# Patient Record
Sex: MALE | Race: WHITE | NOT HISPANIC OR LATINO
[De-identification: names, ages, dates, MRNs, and addresses within clinical notes are randomized per-mention and may not be internally consistent; named-entity substitution may affect disease eponyms.]

---

## 2017-03-22 ENCOUNTER — NON-APPOINTMENT (OUTPATIENT)
Age: 82
End: 2017-03-22

## 2017-03-22 ENCOUNTER — APPOINTMENT (OUTPATIENT)
Dept: CARDIOLOGY | Facility: CLINIC | Age: 82
End: 2017-03-22
Payer: COMMERCIAL

## 2017-03-22 VITALS
HEART RATE: 88 BPM | BODY MASS INDEX: 32.11 KG/M2 | WEIGHT: 205 LBS | DIASTOLIC BLOOD PRESSURE: 74 MMHG | OXYGEN SATURATION: 96 % | SYSTOLIC BLOOD PRESSURE: 133 MMHG

## 2017-03-22 DIAGNOSIS — Z78.9 OTHER SPECIFIED HEALTH STATUS: ICD-10-CM

## 2017-03-22 DIAGNOSIS — Z86.73 PERSONAL HISTORY OF TRANSIENT ISCHEMIC ATTACK (TIA), AND CEREBRAL INFARCTION W/OUT RESIDUAL DEFICITS: ICD-10-CM

## 2017-03-22 DIAGNOSIS — Z86.39 PERSONAL HISTORY OF OTHER ENDOCRINE, NUTRITIONAL AND METABOLIC DISEASE: ICD-10-CM

## 2017-03-22 DIAGNOSIS — I47.1 SUPRAVENTRICULAR TACHYCARDIA: ICD-10-CM

## 2017-03-22 DIAGNOSIS — Z86.79 PERSONAL HISTORY OF OTHER DISEASES OF THE CIRCULATORY SYSTEM: ICD-10-CM

## 2017-03-22 DIAGNOSIS — I35.8 OTHER NONRHEUMATIC AORTIC VALVE DISORDERS: ICD-10-CM

## 2017-03-22 DIAGNOSIS — I86.8 VARICOSE VEINS OF OTHER SPECIFIED SITES: ICD-10-CM

## 2017-03-22 DIAGNOSIS — I25.10 ATHEROSCLEROTIC HEART DISEASE OF NATIVE CORONARY ARTERY W/OUT ANGINA PECTORIS: ICD-10-CM

## 2017-03-22 DIAGNOSIS — Z00.00 ENCOUNTER FOR GENERAL ADULT MEDICAL EXAMINATION W/OUT ABNORMAL FINDINGS: ICD-10-CM

## 2017-03-22 PROCEDURE — 99215 OFFICE O/P EST HI 40 MIN: CPT

## 2017-03-22 PROCEDURE — 93000 ELECTROCARDIOGRAM COMPLETE: CPT

## 2017-03-22 RX ORDER — AMLODIPINE BESYLATE 5 MG/1
5 TABLET ORAL DAILY
Qty: 90 | Refills: 3 | Status: ACTIVE | COMMUNITY
Start: 2017-03-22

## 2017-03-22 RX ORDER — ASPIRIN ENTERIC COATED TABLETS 81 MG 81 MG/1
81 TABLET, DELAYED RELEASE ORAL
Qty: 60 | Refills: 0 | Status: ACTIVE | COMMUNITY
Start: 2017-03-22

## 2017-03-22 RX ORDER — ASPIRIN AND DIPYRIDAMOLE 25; 200 MG/1; MG/1
25-200 CAPSULE, EXTENDED RELEASE ORAL TWICE DAILY
Refills: 0 | Status: DISCONTINUED | COMMUNITY
Start: 2017-03-22 | End: 2017-03-22

## 2017-03-22 RX ORDER — FENOFIBRATE 160 MG/1
160 TABLET ORAL DAILY
Refills: 0 | Status: ACTIVE | COMMUNITY
Start: 2017-03-22

## 2017-03-23 ENCOUNTER — TRANSCRIPTION ENCOUNTER (OUTPATIENT)
Age: 82
End: 2017-03-23

## 2017-03-29 ENCOUNTER — MEDICATION RENEWAL (OUTPATIENT)
Age: 82
End: 2017-03-29

## 2017-03-29 RX ORDER — CLOPIDOGREL BISULFATE 75 MG/1
75 TABLET, FILM COATED ORAL DAILY
Qty: 90 | Refills: 3 | Status: ACTIVE | COMMUNITY
Start: 2017-03-22 | End: 1900-01-01

## 2017-05-02 ENCOUNTER — APPOINTMENT (OUTPATIENT)
Dept: CV DIAGNOSITCS | Facility: HOSPITAL | Age: 82
End: 2017-05-02

## 2017-05-02 ENCOUNTER — OUTPATIENT (OUTPATIENT)
Dept: OUTPATIENT SERVICES | Facility: HOSPITAL | Age: 82
LOS: 1 days | End: 2017-05-02
Payer: COMMERCIAL

## 2017-05-02 DIAGNOSIS — Z98.89 OTHER SPECIFIED POSTPROCEDURAL STATES: Chronic | ICD-10-CM

## 2017-05-02 DIAGNOSIS — I25.10 ATHEROSCLEROTIC HEART DISEASE OF NATIVE CORONARY ARTERY WITHOUT ANGINA PECTORIS: ICD-10-CM

## 2017-05-02 DIAGNOSIS — Q27.30 ARTERIOVENOUS MALFORMATION, SITE UNSPECIFIED: Chronic | ICD-10-CM

## 2017-05-02 PROCEDURE — 93306 TTE W/DOPPLER COMPLETE: CPT | Mod: 26

## 2017-05-02 PROCEDURE — 93306 TTE W/DOPPLER COMPLETE: CPT

## 2017-05-05 ENCOUNTER — NON-APPOINTMENT (OUTPATIENT)
Age: 82
End: 2017-05-05

## 2024-03-23 ENCOUNTER — INPATIENT (INPATIENT)
Facility: HOSPITAL | Age: 89
LOS: 3 days | Discharge: NURSING, REHAB OR LONG-TERM CARE | DRG: 394 | End: 2024-03-27
Attending: INTERNAL MEDICINE | Admitting: INTERNAL MEDICINE
Payer: MEDICARE

## 2024-03-23 VITALS
DIASTOLIC BLOOD PRESSURE: 66 MMHG | OXYGEN SATURATION: 98 % | WEIGHT: 192.02 LBS | TEMPERATURE: 98 F | RESPIRATION RATE: 16 BRPM | HEART RATE: 74 BPM | SYSTOLIC BLOOD PRESSURE: 133 MMHG | HEIGHT: 66 IN

## 2024-03-23 DIAGNOSIS — N40.0 BENIGN PROSTATIC HYPERPLASIA WITHOUT LOWER URINARY TRACT SYMPTOMS: ICD-10-CM

## 2024-03-23 DIAGNOSIS — I10 ESSENTIAL (PRIMARY) HYPERTENSION: ICD-10-CM

## 2024-03-23 DIAGNOSIS — Q27.30 ARTERIOVENOUS MALFORMATION, SITE UNSPECIFIED: Chronic | ICD-10-CM

## 2024-03-23 DIAGNOSIS — Z98.89 OTHER SPECIFIED POSTPROCEDURAL STATES: Chronic | ICD-10-CM

## 2024-03-23 DIAGNOSIS — K92.2 GASTROINTESTINAL HEMORRHAGE, UNSPECIFIED: ICD-10-CM

## 2024-03-23 DIAGNOSIS — I25.10 ATHEROSCLEROTIC HEART DISEASE OF NATIVE CORONARY ARTERY WITHOUT ANGINA PECTORIS: ICD-10-CM

## 2024-03-23 DIAGNOSIS — Z87.19 PERSONAL HISTORY OF OTHER DISEASES OF THE DIGESTIVE SYSTEM: ICD-10-CM

## 2024-03-23 DIAGNOSIS — K92.1 MELENA: ICD-10-CM

## 2024-03-23 LAB
ALBUMIN SERPL ELPH-MCNC: 3.6 G/DL — SIGNIFICANT CHANGE UP (ref 3.3–5)
ALP SERPL-CCNC: 46 U/L — SIGNIFICANT CHANGE UP (ref 40–120)
ALT FLD-CCNC: 13 U/L — SIGNIFICANT CHANGE UP (ref 10–45)
ANION GAP SERPL CALC-SCNC: 15 MMOL/L — SIGNIFICANT CHANGE UP (ref 5–17)
APPEARANCE UR: CLEAR — SIGNIFICANT CHANGE UP
APTT BLD: 28.5 SEC — SIGNIFICANT CHANGE UP (ref 24.5–35.6)
AST SERPL-CCNC: 22 U/L — SIGNIFICANT CHANGE UP (ref 10–40)
BASE EXCESS BLDV CALC-SCNC: -0.7 MMOL/L — SIGNIFICANT CHANGE UP (ref -2–3)
BASOPHILS # BLD AUTO: 0.01 K/UL — SIGNIFICANT CHANGE UP (ref 0–0.2)
BASOPHILS NFR BLD AUTO: 0.2 % — SIGNIFICANT CHANGE UP (ref 0–2)
BILIRUB SERPL-MCNC: 0.3 MG/DL — SIGNIFICANT CHANGE UP (ref 0.2–1.2)
BILIRUB UR-MCNC: NEGATIVE — SIGNIFICANT CHANGE UP
BUN SERPL-MCNC: 29 MG/DL — HIGH (ref 7–23)
CA-I SERPL-SCNC: 1.24 MMOL/L — SIGNIFICANT CHANGE UP (ref 1.15–1.33)
CALCIUM SERPL-MCNC: 9 MG/DL — SIGNIFICANT CHANGE UP (ref 8.4–10.5)
CHLORIDE BLDV-SCNC: 108 MMOL/L — SIGNIFICANT CHANGE UP (ref 96–108)
CHLORIDE SERPL-SCNC: 106 MMOL/L — SIGNIFICANT CHANGE UP (ref 96–108)
CO2 BLDV-SCNC: 26 MMOL/L — SIGNIFICANT CHANGE UP (ref 22–26)
CO2 SERPL-SCNC: 20 MMOL/L — LOW (ref 22–31)
COLOR SPEC: YELLOW — SIGNIFICANT CHANGE UP
CREAT SERPL-MCNC: 1.19 MG/DL — SIGNIFICANT CHANGE UP (ref 0.5–1.3)
DIFF PNL FLD: NEGATIVE — SIGNIFICANT CHANGE UP
EGFR: 58 ML/MIN/1.73M2 — LOW
EOSINOPHIL # BLD AUTO: 0.04 K/UL — SIGNIFICANT CHANGE UP (ref 0–0.5)
EOSINOPHIL NFR BLD AUTO: 0.8 % — SIGNIFICANT CHANGE UP (ref 0–6)
GAS PNL BLDV: 138 MMOL/L — SIGNIFICANT CHANGE UP (ref 136–145)
GAS PNL BLDV: SIGNIFICANT CHANGE UP
GLUCOSE BLDV-MCNC: 106 MG/DL — HIGH (ref 70–99)
GLUCOSE SERPL-MCNC: 120 MG/DL — HIGH (ref 70–99)
GLUCOSE UR QL: NEGATIVE MG/DL — SIGNIFICANT CHANGE UP
HCO3 BLDV-SCNC: 25 MMOL/L — SIGNIFICANT CHANGE UP (ref 22–29)
HCT VFR BLD CALC: 25.5 % — LOW (ref 39–50)
HCT VFR BLD CALC: 25.9 % — LOW (ref 39–50)
HCT VFR BLD CALC: 29.7 % — LOW (ref 39–50)
HCT VFR BLDA CALC: 29 % — LOW (ref 39–51)
HGB BLD CALC-MCNC: 9.7 G/DL — LOW (ref 12.6–17.4)
HGB BLD-MCNC: 7.9 G/DL — LOW (ref 13–17)
HGB BLD-MCNC: 8.3 G/DL — LOW (ref 13–17)
HGB BLD-MCNC: 9.2 G/DL — LOW (ref 13–17)
IMM GRANULOCYTES NFR BLD AUTO: 0.4 % — SIGNIFICANT CHANGE UP (ref 0–0.9)
INR BLD: 1.07 RATIO — SIGNIFICANT CHANGE UP (ref 0.85–1.18)
KETONES UR-MCNC: NEGATIVE MG/DL — SIGNIFICANT CHANGE UP
LACTATE BLDV-MCNC: 1.7 MMOL/L — SIGNIFICANT CHANGE UP (ref 0.5–2)
LEUKOCYTE ESTERASE UR-ACNC: NEGATIVE — SIGNIFICANT CHANGE UP
LIDOCAIN IGE QN: 40 U/L — SIGNIFICANT CHANGE UP (ref 7–60)
LYMPHOCYTES # BLD AUTO: 0.94 K/UL — LOW (ref 1–3.3)
LYMPHOCYTES # BLD AUTO: 18.3 % — SIGNIFICANT CHANGE UP (ref 13–44)
MCHC RBC-ENTMCNC: 31 GM/DL — LOW (ref 32–36)
MCHC RBC-ENTMCNC: 31 GM/DL — LOW (ref 32–36)
MCHC RBC-ENTMCNC: 31.1 PG — SIGNIFICANT CHANGE UP (ref 27–34)
MCHC RBC-ENTMCNC: 31.2 PG — SIGNIFICANT CHANGE UP (ref 27–34)
MCHC RBC-ENTMCNC: 31.2 PG — SIGNIFICANT CHANGE UP (ref 27–34)
MCHC RBC-ENTMCNC: 32 GM/DL — SIGNIFICANT CHANGE UP (ref 32–36)
MCV RBC AUTO: 100.7 FL — HIGH (ref 80–100)
MCV RBC AUTO: 100.8 FL — HIGH (ref 80–100)
MCV RBC AUTO: 97 FL — SIGNIFICANT CHANGE UP (ref 80–100)
MONOCYTES # BLD AUTO: 0.45 K/UL — SIGNIFICANT CHANGE UP (ref 0–0.9)
MONOCYTES NFR BLD AUTO: 8.8 % — SIGNIFICANT CHANGE UP (ref 2–14)
NEUTROPHILS # BLD AUTO: 3.67 K/UL — SIGNIFICANT CHANGE UP (ref 1.8–7.4)
NEUTROPHILS NFR BLD AUTO: 71.5 % — SIGNIFICANT CHANGE UP (ref 43–77)
NITRITE UR-MCNC: NEGATIVE — SIGNIFICANT CHANGE UP
NRBC # BLD: 0 /100 WBCS — SIGNIFICANT CHANGE UP (ref 0–0)
OB PNL STL: POSITIVE
PCO2 BLDV: 44 MMHG — SIGNIFICANT CHANGE UP (ref 42–55)
PH BLDV: 7.36 — SIGNIFICANT CHANGE UP (ref 7.32–7.43)
PH UR: 7 — SIGNIFICANT CHANGE UP (ref 5–8)
PLATELET # BLD AUTO: 154 K/UL — SIGNIFICANT CHANGE UP (ref 150–400)
PLATELET # BLD AUTO: 168 K/UL — SIGNIFICANT CHANGE UP (ref 150–400)
PLATELET # BLD AUTO: 178 K/UL — SIGNIFICANT CHANGE UP (ref 150–400)
PLATELET MAPPING ACTF MAX AMPLITUDE: 15.6 MM — SIGNIFICANT CHANGE UP (ref 2–19)
PLATELET MAPPING ADP MAXIMUM AMPLITUDE: 58.9 MM — SIGNIFICANT CHANGE UP (ref 45–69)
PLATELET MAPPING ADP PERCENT INHIBITION: 11.3 % — SIGNIFICANT CHANGE UP (ref 0–17)
PLATELET MAPPING ARACHIDONIC ACID INHIBITION: 41.2 % — HIGH (ref 0–11)
PLATELET MAPPING HKH MAXIMUM AMPLITUDE: 64.4 MM — SIGNIFICANT CHANGE UP (ref 53–68)
PO2 BLDV: 42 MMHG — SIGNIFICANT CHANGE UP (ref 25–45)
POTASSIUM BLDV-SCNC: 4.6 MMOL/L — SIGNIFICANT CHANGE UP (ref 3.5–5.1)
POTASSIUM SERPL-MCNC: 4.6 MMOL/L — SIGNIFICANT CHANGE UP (ref 3.5–5.3)
POTASSIUM SERPL-SCNC: 4.6 MMOL/L — SIGNIFICANT CHANGE UP (ref 3.5–5.3)
PROT SERPL-MCNC: 6.7 G/DL — SIGNIFICANT CHANGE UP (ref 6–8.3)
PROT UR-MCNC: NEGATIVE MG/DL — SIGNIFICANT CHANGE UP
PROTHROM AB SERPL-ACNC: 11.8 SEC — SIGNIFICANT CHANGE UP (ref 9.5–13)
RBC # BLD: 2.53 M/UL — LOW (ref 4.2–5.8)
RBC # BLD: 2.67 M/UL — LOW (ref 4.2–5.8)
RBC # BLD: 2.95 M/UL — LOW (ref 4.2–5.8)
RBC # FLD: 14.9 % — HIGH (ref 10.3–14.5)
RBC # FLD: 15 % — HIGH (ref 10.3–14.5)
RBC # FLD: 16.8 % — HIGH (ref 10.3–14.5)
SAO2 % BLDV: 72.1 % — SIGNIFICANT CHANGE UP (ref 67–88)
SODIUM SERPL-SCNC: 141 MMOL/L — SIGNIFICANT CHANGE UP (ref 135–145)
SP GR SPEC: >1.03 — HIGH (ref 1–1.03)
UROBILINOGEN FLD QL: 0.2 MG/DL — SIGNIFICANT CHANGE UP (ref 0.2–1)
WBC # BLD: 4.64 K/UL — SIGNIFICANT CHANGE UP (ref 3.8–10.5)
WBC # BLD: 5.11 K/UL — SIGNIFICANT CHANGE UP (ref 3.8–10.5)
WBC # BLD: 5.13 K/UL — SIGNIFICANT CHANGE UP (ref 3.8–10.5)
WBC # FLD AUTO: 4.64 K/UL — SIGNIFICANT CHANGE UP (ref 3.8–10.5)
WBC # FLD AUTO: 5.11 K/UL — SIGNIFICANT CHANGE UP (ref 3.8–10.5)
WBC # FLD AUTO: 5.13 K/UL — SIGNIFICANT CHANGE UP (ref 3.8–10.5)

## 2024-03-23 PROCEDURE — 86077 PHYS BLOOD BANK SERV XMATCH: CPT

## 2024-03-23 PROCEDURE — 99222 1ST HOSP IP/OBS MODERATE 55: CPT | Mod: GC

## 2024-03-23 PROCEDURE — 74177 CT ABD & PELVIS W/CONTRAST: CPT | Mod: 26

## 2024-03-23 PROCEDURE — 99285 EMERGENCY DEPT VISIT HI MDM: CPT | Mod: FS

## 2024-03-23 PROCEDURE — 93010 ELECTROCARDIOGRAM REPORT: CPT

## 2024-03-23 RX ORDER — FENOFIBRATE,MICRONIZED 130 MG
145 CAPSULE ORAL DAILY
Refills: 0 | Status: DISCONTINUED | OUTPATIENT
Start: 2024-03-23 | End: 2024-03-27

## 2024-03-23 RX ORDER — PANTOPRAZOLE SODIUM 20 MG/1
8 TABLET, DELAYED RELEASE ORAL
Qty: 80 | Refills: 0 | Status: DISCONTINUED | OUTPATIENT
Start: 2024-03-23 | End: 2024-03-24

## 2024-03-23 RX ORDER — TAMSULOSIN HYDROCHLORIDE 0.4 MG/1
0.4 CAPSULE ORAL AT BEDTIME
Refills: 0 | Status: DISCONTINUED | OUTPATIENT
Start: 2024-03-23 | End: 2024-03-27

## 2024-03-23 RX ORDER — PANTOPRAZOLE SODIUM 20 MG/1
40 TABLET, DELAYED RELEASE ORAL ONCE
Refills: 0 | Status: COMPLETED | OUTPATIENT
Start: 2024-03-23 | End: 2024-03-23

## 2024-03-23 RX ORDER — KETOROLAC TROMETHAMINE 0.5 %
1 DROPS OPHTHALMIC (EYE)
Refills: 0 | DISCHARGE

## 2024-03-23 RX ORDER — KETOROLAC TROMETHAMINE 0.5 %
1 DROPS OPHTHALMIC (EYE)
Refills: 0 | Status: DISCONTINUED | OUTPATIENT
Start: 2024-03-23 | End: 2024-03-27

## 2024-03-23 RX ORDER — OMEPRAZOLE 10 MG/1
1 CAPSULE, DELAYED RELEASE ORAL
Refills: 0 | DISCHARGE

## 2024-03-23 RX ORDER — ATORVASTATIN CALCIUM 80 MG/1
10 TABLET, FILM COATED ORAL AT BEDTIME
Refills: 0 | Status: DISCONTINUED | OUTPATIENT
Start: 2024-03-23 | End: 2024-03-27

## 2024-03-23 RX ADMIN — PANTOPRAZOLE SODIUM 10 MG/HR: 20 TABLET, DELAYED RELEASE ORAL at 21:33

## 2024-03-23 RX ADMIN — PANTOPRAZOLE SODIUM 40 MILLIGRAM(S): 20 TABLET, DELAYED RELEASE ORAL at 09:53

## 2024-03-23 RX ADMIN — Medication 1 DROP(S): at 23:51

## 2024-03-23 RX ADMIN — ATORVASTATIN CALCIUM 10 MILLIGRAM(S): 80 TABLET, FILM COATED ORAL at 21:34

## 2024-03-23 RX ADMIN — PANTOPRAZOLE SODIUM 10 MG/HR: 20 TABLET, DELAYED RELEASE ORAL at 10:02

## 2024-03-23 RX ADMIN — TAMSULOSIN HYDROCHLORIDE 0.4 MILLIGRAM(S): 0.4 CAPSULE ORAL at 21:34

## 2024-03-23 RX ADMIN — Medication 1 DROP(S): at 18:39

## 2024-03-23 NOTE — CHART NOTE - NSCHARTNOTEFT_GEN_A_CORE
hemoglobin dropped to 7.9 stays hemodynamically stable   discussed with GI fellow re drop in hemoglobin  GI have d/w IR who Recommended observation and supportive care and to recall if becomes hemodynamically unstablle  discussed with ACP  vitals q 4 and stat  recall IR if BP < 100  transfusion one unit stat   CBC q 6

## 2024-03-23 NOTE — H&P ADULT - NSHPADDITIONALINFOADULT_GEN_ALL_CORE
discussed with patient in detail, expresses understanding of treatment plans.  discussed with patient's wife at bedside in detail  discussed with Emergency Department attending   discussed with covering ACP

## 2024-03-23 NOTE — H&P ADULT - ASSESSMENT
89-year-old male history of CAD status post quadruple bypass on Aggrenox, HTN, HLD, AVM presents with rectal bleeding CTA positive for active colonic bleed likely diverticular in etiology, hemodynamically stable

## 2024-03-23 NOTE — ED PROVIDER NOTE - NSICDXPASTSURGICALHX_GEN_ALL_CORE_FT
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 patient is not 100% sure    CBG (Coronary Bypass Graft)     H/O hemorrhoidectomy     Knee Joint Replacement

## 2024-03-23 NOTE — ED ADULT NURSE NOTE - OBJECTIVE STATEMENT
89 y/o male with PMH of arrives to the ER complaining of rectal bleeding. Pt reports developing rectal bleeding since4 am this morning. Pt rectal dark blood  Pt denies SOB, chest pain, dizziness, N/V/D, urinary symptoms, fevers, chills.  On assessment pt is well appearing, A&Ox4, speaking coherently, airway is patent, breathing spontaneously and unlabored. Skin is dry, warm. Abdomen is soft, no distended, no tender. Full ROM in all extremities.  Patient undressed and placed into gown,  Pt placed on continuous cardiac monitor, NSR noted, bilateral; bilateral 20 G RAC placed, blood sent to the lab. Comfort and safety provided, side rails up with bed locked and in lowest position for safety. call bell within reach. [place] provided. will continue to reassess. 89 y/o male with PMH of  CAD status post quadruple bypass on Aggrenox, HTN, HLD, AVM arrives to the ER complaining of rectal bleeding. Pt reports developing rectal bleeding since 4 am this morning. Pt reports several episodes of dark blood rectally.  Pt reports taking Aggrenox twice at day, last intake was last night. At arrival pt denies SOB, chest pain, dizziness, N/V/D, urinary symptoms, fevers, chills.  On assessment pt is well appearing, A&Ox4, speaking coherently, airway is patent, breathing spontaneously and unlabored. Skin is dry, warm. Abdomen is soft, no distended, no tender. Full ROM in all extremities.  Patient undressed and placed into gown,  Pt placed on continuous cardiac monitor, NSR noted, bilateral 20 G RAC placed in the median cubital, blood sent to the lab. Comfort and safety provided, side rails up with bed locked and in lowest position for safety. call bell within reach. [place] provided. 89 y/o male with PMH of  CAD status post quadruple bypass on Aggrenox, HTN, HLD, AVM arrives to the ER complaining of rectal bleeding. Pt reports developing rectal bleeding since 4 am this morning. Pt reports noticing initially a red bright blood in the stool, after that having several episodes of dark blood rectally.  Pt reports taking Aggrenox twice at day, last intake was last night. At arrival pt denies SOB, chest pain, dizziness, N/V/D, urinary symptoms, fevers, chills.  On assessment pt is well appearing, A&Ox4, speaking coherently, airway is patent, breathing spontaneously and unlabored. Skin is dry, warm. Abdomen is soft, no distended, no tender. Full ROM in all extremities.  Patient undressed and placed into gown,  Pt placed on continuous cardiac monitor, NSR noted, bilateral 20 G RAC placed in the median cubital, blood sent to the lab. Comfort and safety provided, side rails up with bed locked and in lowest position for safety. call bell within reach. [place] provided. 89 y/o male with PMH of BPH, CAD status post quadruple bypass on Aggrenox, HTN, HLD, AVM arrives to the ER complaining of rectal bleeding. Pt reports developing rectal bleeding since 4 am this morning. Pt reports noticing initially a red bright blood in the stool, after that having several episodes of dark blood rectally.  Pt reports taking Aggrenox twice at day, last intake was last night. At arrival pt denies SOB, chest pain, dizziness, N/V/D, urinary symptoms, fevers, chills.  On assessment pt is well appearing, A&Ox4, speaking coherently, airway is patent, breathing spontaneously and unlabored. Skin is dry, warm. Abdomen is soft, no distended, no tender. Full ROM in all extremities.  Patient undressed and placed into gown. Pt placed on continuous cardiac monitor, NSR noted, bilateral 20 G RAC placed in the median cubital, blood sent to the lab. Comfort and safety provided, side rails up with bed locked and in lowest position for safety. call bell within reach. [place] provided.

## 2024-03-23 NOTE — CHART NOTE - NSCHARTNOTEFT_GEN_A_CORE
discussed with GI service earlier  ok to advance diet to clears    MICU note appreciated  deemed not to require transfer to MICU at this time     if patient becomes hemodynamically unstable will recall IR and MICU and keep patient NPO.    discussed with night ACP

## 2024-03-23 NOTE — ED PROVIDER NOTE - NSICDXPASTMEDICALHX_GEN_ALL_CORE_FT
PAST MEDICAL HISTORY:  BPH (Benign Prostatic Hyperplasia)     CAD (Coronary Artery Disease)     Coronary Artery Bypass Surgery 1998    HTN (Hypertension)     Hyperlipidemia     Lumbar Spinal Stenosis

## 2024-03-23 NOTE — ED ADULT NURSE REASSESSMENT NOTE - NS ED NURSE REASSESS COMMENT FT1
Pt resting comfortably in bed, denies any adverse transfusion reactions at this time. No signs of transfusion reaction noted. Call bell within reach.

## 2024-03-23 NOTE — ED PROVIDER NOTE - OBJECTIVE STATEMENT
89-year-old male history of CAD status post quadruple bypass on Aggrenox, HTN, HLD, AVM presents with rectal bleeding.  Patient reports 1 day of blood in stools initially had 1 episode of bright red blood streaking stools since then has had several episodes of loose dark foul-smelling stools.  Last took Aggrenox last night.  Patient wife reports he has been lethargic the last 2 days.  Patient denies abdominal pain, nausea, vomiting, hematemesis, hematuria, bruising, CP, SOB, weight changes, urinary symptoms.  Last colonoscopy/endoscopy over 10 years ago.  Follows with PMD at [facility]

## 2024-03-23 NOTE — H&P ADULT - PROBLEM SELECTOR PLAN 2
asymptomatic  chest pain free  continue statin  patient is euvolemic with no evidence of fluid overload

## 2024-03-23 NOTE — CONSULT NOTE ADULT - ASSESSMENT
Recommendations  - discussed with IR team - will plan for colonoscopy on Monday for evaluation of GI bleeding. If patient were to become hemodynamically unstable, recommend calling GI   - CLD  -     All recommendations are tentative until note is attested by attending.     Eva Nicole, PGY6  Gastroenterology/Hepatology Fellow  Available on [employer] Teams  [phone] (LiveRSVP Short Range Pager)  [phone] (Long Range Pager)    After 5pm, please contact the on-call GI fellow. [phone] 89-year-old male history of CAD status post quadruple bypass on Aggrenox, HTN, HLD, AVM presents with rectal bleeding.  Patient reports 1 day of blood in stools initially had 1 episode of bright red blood streaking stools since then has had several episodes of loose dark foul-smelling stools.      Recommendations  - trend CBC, keep active T&S, transfuse for Hgb<8  - discussed with IR team - will plan for colonoscopy on Monday for evaluation of GI bleeding. If patient were to become hemodynamically unstable, recommend calling IR for embolization  - CLD  - will order bowel prep tomorrow, please ensure that patient completes it  - NPO after MN on Sunday night  - please obtain cardiology optimization  - would hold the aggrenox if no cardiology contraindication, can continue ASA    All recommendations are tentative until note is attested by attending.     Eva Nicole, PGY6  Gastroenterology/Hepatology Fellow  Available on Microsoft Teams  [phone] (SurveySnap Short Range Pager)  [phone] (Long Range Pager)    After 5pm, please contact the on-call GI fellow. [phone]

## 2024-03-23 NOTE — H&P ADULT - HISTORY OF PRESENT ILLNESS
89-year-old male history of CAD status post quadruple bypass on Aggrenox, HTN, HLD, AVM presents with rectal bleeding.  Patient reports 1 day of blood in stools initially had 1 episode of bright red blood streaking stools since then has had several episodes of loose dark foul-smelling stools.  Last took Aggrenox last night.  Patient wife reports he has been lethargic the last 2 days.  Patient denies abdominal pain, nausea, vomiting, hematemesis, hematuria, bruising, chest pain or shortness of breath, weight changes, urinary symptoms.  Last colonoscopy/endoscopy over 10 years ago when as per patient he has an experimental "stent" placed in the colon. No current symptoms  89-year-old male history of CAD status post quadruple bypass on Aggrenox, HTN, HLD, AVM presents with rectal bleeding.  Patient reports 1 day of blood in stools initially had 1 episode of bright red blood streaking stools since then has had several episodes of loose dark foul-smelling stools.  Last took Aggrenox last night.  Patient wife reports he has been lethargic the last 2 days.  Patient denies abdominal pain, nausea, vomiting, hematemesis, hematuria, bruising, chest pain or shortness of breath, weight changes, urinary symptoms.  Last colonoscopy/endoscopy over 10 years ago when as per patient he has an experimental "stent" placed in the colon, however upon clarification it was a dural stent placed for non-GI related issue. No current symptoms

## 2024-03-23 NOTE — CONSULT NOTE ADULT - ASSESSMENT
89-year-old male history of CAD status post quadruple bypass on Aggrenox, HTN, HLD, AVM presents with rectal bleeding CTA positive for active colonic bleed suspected active bleed in ascending colon. MICU consulted for active hemorrhage.     #Melena  - Currently hemodynamically stable w/ /70 and asx  - Hgb initially 9.2 --> 7.9 --> 8.3  - S/p 1U pRBC's and currently receiving 1U PLT  - 1 episode of BRBPR since admission to hospital  - Seen by GI and IR w/ plans for colonoscopy on Monday     Recommendations:  - Trend CBC, keep active T&S, transfuse for Hgb <8  - Pending nonurgent scope for Monday   - If pt decompensates, please f/u IR and GI    Pt does not require ICU level of care at this time.       Final recommendations pending attestations by ICU attending.

## 2024-03-23 NOTE — CONSULT NOTE ADULT - ASSESSMENT
Interventional Radiology    Evaluate for Procedure:     HPI: 89y Male with PMHx of CAD status post quadruple bypass on Aggrenox, HTN, HLD, AVM presents with rectal bleeding.  Patient reports 1 day of blood in stools initially had 1 episode of bright red blood streaking stools since then has had several episodes of loose dark foul-smelling stools.  Last took Aggrenox last night.  Patient wife reports he has been lethargic the last 2 days.  Patient denies abdominal pain, nausea, vomiting, hematemesis, hematuria, bruising, chest pain or shortness of breath, weight changes, urinary symptoms.  Last colonoscopy/endoscopy over 10 years ago when as per patient he has an experimental "stent" placed in the colon, however upon clarification it was a dural stent placed for non-GI related issue. Patient reportedly has history of GI bleed in the past requiring IR intervention. IR c/s for possible angio/embo.    Allergies: penicillins (Vomiting)    Medications (Abx/Cardiac/Anticoagulation/Blood Products)      Data:  167.6  87.1  T(C): 36.7  HR: 70  BP: 123/60  RR: 16  SpO2: 100%    -WBC 5.11 / HgB 7.9 / Hct 25.5 / Plt 168  -Na 141 / Cl 106 / BUN 29 / Glucose 120  -K 4.6 / CO2 20 / Cr 1.19  -ALT 13 / Alk Phos 46 / T.Bili 0.3  -INR 1.07 / PTT 28.5      Radiology:     Assessment/Plan:   89y Male with PMHx of CAD status post quadruple bypass on Aggrenox, HTN, HLD, AVM presents with rectal bleeding.  Patient reports 1 day of blood in stools initially had 1 episode of bright red blood streaking stools since then has had several episodes of loose dark foul-smelling stools.  Last took Aggrenox last night.  Patient wife reports he has been lethargic the last 2 days.  Patient denies abdominal pain, nausea, vomiting, hematemesis, hematuria, bruising, chest pain or shortness of breath, weight changes, urinary symptoms.  Last colonoscopy/endoscopy over 10 years ago when as per patient he has an experimental "stent" placed in the colon, however upon clarification it was a dural stent placed for non-GI related issue. Patient reportedly has history of GI bleed in the past requiring IR intervention. IR c/s for possible angio/embo.    -- Given pt currently HDS, discussed with GI agree plan of nonemergent scope Monday allowing time for prep. Recommend conservative management at this time.  -- Please hold aggrenox as possible. Recommend giving platelets in attempt to replace dysfunctional platelets.  -- Recommend aggressive resuscitation as needed, goal Hgb >7.  -- If patient clinically decompensates, please reach out and can re-evaluate at that time. IR will continue to follow.    Bart Montero M.D.  PGY4/R3, Interventional Radiology Resident    -Available on Microsoft TEAMS for all non-urgent questions  -Emergent issues: [facility]-p.[phone]; [facility]-p.[phone] ([phone])  -Non-emergent consults: Please place a [place] order "Consult-Interventional Radiology" with an appropriate callback number  -Scheduling questions: [facility]: [phone]; [facility]: [phone]  -Clinic/Outpatient booking: [facility]: [phone]; [facility]: [phone]

## 2024-03-23 NOTE — ED PROVIDER NOTE - PROGRESS NOTE DETAILS
call placed to pt's PMD Dr Emile Arreola, left answer for answering service, awaiting callback - Jong Boone PA-C spoke with Dr Arreola, pt has hx of AVM, gastric ulcer, colonic polyps, external hemorrhoids. does not have admitting privileges. if requires admission will go to unattached hospitalist - Jong Boone PA-C Spoke with Dr. Best, recently saw him for recurrent hemorrhoids which are not bothering patient at this time.  Last hemoglobin was 10 in January.  Agrees with plan for admission, he will text Dr. Orourke and his wife for evaluation while inpatient.  Patient stable for admission at this time. - Jong Boone PA-C spoke again with Dr Best, Dr Orourke is unavailable to evaluate pt, requests house GI eval. consult placed via email - Jong Boone PA-C spoke with GI fellow Dr Nicole, aware of pt will see while admitted - Jong Boone PA-C Received call from radiology - CT suggestive of active bleeding from ascending colon, diverticula present. GI updated on results - Jong Boone PA-C Attending MD Lewis: CT bleeding scan findings are noted.  I was able to reach IR fellow on-call, he requested that I place consultation request through the computer because he is in the middle of a procedure.  Patient is currently hemodynamically stable and not requiring urgent PRBC transfusion as of yet.  GI team was notified by teams of CT findings.  Paged admitting hospitalist Dr. De Luna to make him aware of this as well awaiting his call back Attending MD Lewis: I discussed case with admitting hospitalist Dr. Joseph, he was made aware of CT findings of active colonic bleeding. Patient made aware findings as well.  Dr Joseph requests repeat CBC to be sent now.  I reassessed patient he states he feels fine at this time however he did have another bloody bowel movement just now.  Patient was being changed with vital signs not yet documented, will repeat vital signs and continue observation. Attending MD Lewis: I discussed case with interventional radiology who stated no immediate plans for IR embolization of active bleeding seen on CT.  They states if patient is hemodynamically stable they would recommend continued monitoring and PRBC resuscitation as clinically indicated.  GI team saw patient as well and GI spoke directly with the IR team and they are in agreement for watchful waiting at this time.

## 2024-03-23 NOTE — CONSULT NOTE ADULT - ATTENDING COMMENTS
89M CAD s/p 4vCABG on aggrenox, previous sigmoid colon AVM c/b GI bleeding s/p subselective catheterization of the tertiary branch of the distal HOPE and embolization by IR in 10/2015 presents with painless maroon hematochezia since 4 am today.    Hgb 9.2 on admission, recheck after multiple bloody bowel movements 7.9, now receiving 1u pRBC. Baseline 10 per ED documentation, patient receives all his care through [facility]. CTAP with diverticulosis and active hemorrhage in the ascending colon. Lactate 1.7, afebrile, HR 60s-70s, SBPs 120s-150s/60s-80s. Abd soft. Last colonoscopy 5+ years ago. Outpatient GI is Este[facility] ([facility]).    Presentation most c/w LGIB, likely diverticular vs AVM. Spoke with IR, hesitant to proceed to angio/embolization given hemodynamic stability and risks of these procedures. Discussed with IR and patient that an ascending source, particularly in presence of diverticular disease, requires a clean colon for safe evaluation.     We will plan for a colonoscopy on Monday morning with full anesthesia and nursing support, as this is the safest plan for a patient in his 90s with significant cardiac comorbidity who is hemodynamically stable, however if he decompensates in the interim IR will proceed to angio with possible embolization.    Continue clear liquid diet. Please obtain cardiology clearance for planned sedated colonoscopy. Please give the following: dulcolax 10 mg PO at noon tomorrow followed by 4L golytely, first half at 5 pm, second half at 11 pm. 89M CAD s/p 4vCABG on aggrenox, previous sigmoid colon AVM c/b GI bleeding s/p subselective catheterization of the tertiary branch of the distal HOPE and embolization by IR in 10/2015 presents with painless maroon hematochezia since 4 am today.    Hgb 9.2 on admission, recheck after multiple bloody bowel movements 7.9, now receiving 1u pRBC. Baseline 10 per ED documentation, patient receives all his care through [facility]. CTAP with diverticulosis and active hemorrhage in the ascending colon. Lactate 1.7, afebrile, HR 60s-70s, SBPs 120s-150s/60s-80s. Abd soft. Last colonoscopy 5+ years ago. Outpatient GI is [facility] ([facility]).    Presentation most c/w LGIB, likely diverticular vs AVM. Spoke with IR, hesitant to proceed to angio/embolization given hemodynamic stability and risks of these procedures. Discussed with IR and patient that an ascending source, particularly in presence of diverticular disease, requires a clean colon for safe evaluation.     We will plan for a colonoscopy on Monday morning with full anesthesia and nursing support, as this is the safest plan for a patient in his 90s with significant cardiac comorbidity who is hemodynamically stable, however if he decompensates in the interim IR will proceed to angio with possible embolization.    Continue clear liquid diet. Hold AC/antiplatelet agents/NSAIDs. Transfuse to hgb >8. Please obtain cardiology clearance for planned sedated colonoscopy. Please give the following: dulcolax 10 mg PO at noon tomorrow followed by 4L golytely, first half at 5 pm, second half at 11 pm. 89M CAD s/p 4vCABG on aggrenox, previous sigmoid colon AVM c/b GI bleeding s/p subselective catheterization of the tertiary branch of the distal HOPE and embolization by IR in 10/2015 presents with painless maroon hematochezia since 4 am today.    Hgb 9.2 on admission, recheck after multiple bloody bowel movements 7.9, now receiving 1u pRBC. Baseline 10 per ED documentation, patient receives all his care through [facility]. CTAP with diverticulosis and active hemorrhage in the ascending colon. Lactate 1.7, afebrile, HR 60s-70s, SBPs 120s-150s/60s-80s. Abd soft. Last colonoscopy 5+ years ago. Outpatient GI is [facility] ([facility]).    Presentation most c/w LGIB, likely diverticular vs AVM. Spoke with IR, hesitant to proceed to angio/embolization given hemodynamic stability and risks of these procedures. Discussed with IR and patient that an ascending source, particularly in presence of diverticular disease, requires a clean colon for safe evaluation.     We will plan for a colonoscopy on Monday morning with full anesthesia and nursing support, as this is the safest plan for a patient in his 90s with significant cardiac comorbidity who is hemodynamically stable, however if he decompensates in the interim IR will proceed to angio with possible embolization.    Continue clear liquid diet. Hold AC/antiplatelet agents, can continue bASA. Transfuse to hgb >8. Please obtain cardiology clearance for planned sedated colonoscopy. Please give the following: dulcolax 10 mg PO at noon tomorrow followed by 4L golytely, first half at 5 pm, second half at 11 pm.

## 2024-03-23 NOTE — ED PROVIDER NOTE - ATTENDING APP SHARED VISIT CONTRIBUTION OF CARE
Attending MD Lewis: I personally made/approved the management plan and take responsibility for the patient management.      89-year-old gentleman with a history of CAD status post CABG hypertension hyperlipidemia, previous lower GI bleed that required HOPE embolizations presenting for evaluation of black stool and fatigue since yesterday.  He denies any abdominal pain.  Denies any hematochezia.  No fevers or chills.  He does state that the stool is slightly loose.  He takes Aggrenox.    GI: Dr. Best    PMD: Emile Arreola    Patient's vital signs are within normal limits.  He is sitting in the stretcher in no apparent distress.  Clear lungs anteriorly regular heart sounds the abdomen is soft mild distention no tenderness to palpation extremities warm and well-perfused.  Nonpitting edema bilateral ankles, moves all extremity spontaneously.      Patient's presentation is concerning for possible upper GI bleeding, plan to obtain 2 peripheral IV access type and screen coagulation profile IV PPI GI consultation as well as CT of the abdomen bleeding protocol and attempt to localize source of possible GI bleeding given patient has had fairly brisk bleed in the past.  At the time patient is hemodynamically stable and does not require emergent transfusion      *The above represents an initial assessment/impression. Please refer to progress notes for potential changes in patient clinical course*

## 2024-03-23 NOTE — H&P ADULT - NSHPPHYSICALEXAM_GEN_ALL_CORE
PHYSICAL EXAM: vital signs noted on [place]  in no apparent distress  HEENT: OSMAR EOMI  Neck: Supple, no JVD, no thyromegaly  Lungs: no wheeze, no crackles  CVS: S1 S2 ejection systolic murmur +   Abdomen: no tenderness, no organomegaly, BS present  Neuro: AO x 3 no focal weakness, no sensory abnormalities  Psych: appropriate affect  Skin: warm, dry  Ext: no cyanosis or clubbing  chronic LLE non-pitting edema  Msk: no joint swelling or deformities  Back: no CVA tenderness, no kyphosis/scoliosis

## 2024-03-23 NOTE — ED ADULT NURSE NOTE - NSFALLHARMRISKINTERV_ED_ALL_ED

## 2024-03-23 NOTE — ED PROVIDER NOTE - CLINICAL SUMMARY MEDICAL DECISION MAKING FREE TEXT BOX
89-year-old male history of CAD status post quadruple bypass on Aggrenox, HTN, HLD, AVM presents with rectal bleeding described a 1 bright red episode then dark a/w 1 day of fatigue per spouse. triage vitals nonactionable. pt well appearing conversive AOx4, abd soft NT ND, small external hemorrhoid and dark dried stool to perineum. ddx upper GI bleed from gastritis/PUD/recurrent AVM, lower GI bleed from diverticulosis/hemorrhoids. labs with coags, T&S, transfuse PRN. likely tba to trend CBCs, GI eval - Jong Boone PA-C

## 2024-03-23 NOTE — CONSULT NOTE ADULT - SUBJECTIVE AND OBJECTIVE BOX
HPI:  89-year-old male history of CAD status post quadruple bypass on Aggrenox, HTN, HLD, AVM presents with rectal bleeding.  Patient reports 1 day of blood in stools initially had 1 episode of bright red blood streaking stools since then has had several episodes of loose dark foul-smelling stools.  Last took Aggrenox last night.  Patient wife reports he has been lethargic the last 2 days.  Patient denies abdominal pain, nausea, vomiting, hematemesis, hematuria, bruising, chest pain or shortness of breath, weight changes, urinary symptoms.  Last colonoscopy/endoscopy over 10 years ago when as per patient he has an experimental "stent" placed in the colon, however upon clarification it was a dural stent placed for non-GI related issue.      Allergies:  penicillins (Vomiting)    Home Medications:  Aggrenox 25 mg-200 mg oral capsule, extended release: 1 cap(s) orally 2 times a day (23 Mar 2024 10:55)  atorvastatin 10 mg oral tablet: 1 tab(s) orally once a day (at bedtime) (23 Mar 2024 10:56)  fenofibrate 160 mg oral tablet: 1 tab(s) orally once a day (23 Mar 2024 10:56)  Flomax 0.4 mg oral capsule: 1 cap(s) orally once a day (23 Mar 2024 10:56)  ketorolac 0.5% ophthalmic solution: 1 drop(s) in the left conjunctival sac 4 times a day (23 Mar 2024 11:46)  loperamide 2 mg oral capsule: 1 cap(s) orally once a day as needed for  diarrhea (23 Mar 2024 10:57)  metoprolol succinate 25 mg oral tablet, extended release: 1 tab(s) orally once a day (23 Mar 2024 10:57)  Multiple Vitamins oral tablet: 1 tab(s) orally once a day (23 Mar 2024 10:57)  omeprazole 20 mg oral delayed release capsule: 1 cap(s) orally once a day (23 Mar 2024 10:57)    Hospital Medications:  atorvastatin 10 milliGRAM(s) Oral at bedtime  fenofibrate Tablet 145 milliGRAM(s) Oral daily  ketorolac 0.5% Ophthalmic Solution 1 Drop(s) Both EYES four times a day  pantoprazole Infusion 8 mG/Hr IV Continuous <Continuous>  tamsulosin 0.4 milliGRAM(s) Oral at bedtime    PMHX/PSHX:  Coronary Artery Bypass Surgery    BPH (Benign Prostatic Hyperplasia)    Hyperlipidemia    HTN (Hypertension)    CAD (Coronary Artery Disease)    Lumbar Spinal Stenosis    CBG (Coronary Bypass Graft)    Knee Joint Replacement    H/O hemorrhoidectomy    Arteriovenous malformation (AVM)        Family history:  Family history of CHF (congestive heart failure) (Mother)        Denies family history of colon cancer/polyps, stomach cancer/polyps, pancreatic cancer/masses, liver cancer/disease, ovarian cancer and endometrial cancer.    Social History:   Tob: Denies  EtOH: Denies  Illicit Drugs: Denies    ROS:     General:  No wt loss, fevers, chills, night sweats, fatigue  Eyes:  Good vision, no reported pain  ENT:  No sore throat, pain, runny nose, dysphagia  CV:  No pain, palpitations, hypo/hypertension  Pulm:  No dyspnea, cough, tachypnea, wheezing  GI:  see HPI  :  No pain, bleeding, incontinence, nocturia  Muscle:  No pain, weakness  Neuro:  No weakness, tingling, memory problems  Psych:  No fatigue, insomnia, mood problems, depression  Endocrine:  No polyuria, polydipsia, cold/heat intolerance  Heme:  No petechiae, ecchymosis, easy bruisability  Skin:  No rash, tattoos, scars, edema    PHYSICAL EXAM:   GENERAL:  No acute distress  HEENT:  NCAT, no scleral icterus   CHEST:  no respiratory distress  HEART:  Regular rate and rhythm  ABDOMEN:  Soft, non-tender, non-distended  EXTREMITIES: No edema  SKIN:  No rash/erythema/ecchymoses  NEURO:  Alert and oriented x 3    Vital Signs:  Vital Signs Last 24 Hrs  T(C): 36.7 (23 Mar 2024 11:30), Max: 36.7 (23 Mar 2024 11:30)  T(F): 98 (23 Mar 2024 11:30), Max: 98 (23 Mar 2024 11:30)  HR: 70 (23 Mar 2024 13:16) (70 - 74)  BP: 123/60 (23 Mar 2024 13:16) (123/60 - 154/77)  BP(mean): --  RR: 16 (23 Mar 2024 13:16) (15 - 20)  SpO2: 100% (23 Mar 2024 13:16) (97% - 100%)    Parameters below as of 23 Mar 2024 13:16  Patient On (Oxygen Delivery Method): room air      Daily Height in cm: 167.64 (23 Mar 2024 08:46)    Daily     LABS:                        9.2    5.13  )-----------( 178      ( 23 Mar 2024 09:24 )             29.7     Mean Cell Volume: 100.7 fl (03-23-24 @ 09:24)    03-23    141  |  106  |  29<H>  ----------------------------<  120<H>  4.6   |  20<L>  |  1.19    Ca    9.0      23 Mar 2024 09:24    TPro  6.7  /  Alb  3.6  /  TBili  0.3  /  DBili  x   /  AST  22  /  ALT  13  /  AlkPhos  46  03-23    LIVER FUNCTIONS - ( 23 Mar 2024 09:24 )  Alb: 3.6 g/dL / Pro: 6.7 g/dL / ALK PHOS: 46 U/L / ALT: 13 U/L / AST: 22 U/L / GGT: x           PT/INR - ( 23 Mar 2024 09:24 )   PT: 11.8 sec;   INR: 1.07 ratio         PTT - ( 23 Mar 2024 09:24 )  PTT:28.5 sec  Urinalysis Basic - ( 23 Mar 2024 11:33 )    Color: Yellow / Appearance: Clear / SG: >1.030 / pH: x  Gluc: x / Ketone: Negative mg/dL  / Bili: Negative / Urobili: 0.2 mg/dL   Blood: x / Protein: Negative mg/dL / Nitrite: Negative   Leuk Esterase: Negative / RBC: x / WBC x   Sq Epi: x / Non Sq Epi: x / Bacteria: x      Amylase Serum--      Lipase serum40       Ammonia--                          9.2    5.13  )-----------( 178      ( 23 Mar 2024 09:24 )             29.7       Imaging:             HPI:  89-year-old male history of CAD status post quadruple bypass on Aggrenox, HTN, HLD, AVM presents with rectal bleeding.  Patient reports 1 day of blood in stools initially had 1 episode of bright red blood streaking stools since then has had several episodes of loose dark foul-smelling stools.  Last took Aggrenox last night.  Patient wife reports he has been lethargic the last 2 days.  Patient denies abdominal pain, nausea, vomiting, hematemesis, hematuria, bruising, chest pain or shortness of breath, weight changes, urinary symptoms.  Last colonoscopy/endoscopy over 10 years ago when as per patient he has an experimental "stent" placed in the colon, however upon clarification it was a dural stent placed for non-GI related issue.      Allergies:  penicillins (Vomiting)    Home Medications:  Aggrenox 25 mg-200 mg oral capsule, extended release: 1 cap(s) orally 2 times a day (23 Mar 2024 10:55)  atorvastatin 10 mg oral tablet: 1 tab(s) orally once a day (at bedtime) (23 Mar 2024 10:56)  fenofibrate 160 mg oral tablet: 1 tab(s) orally once a day (23 Mar 2024 10:56)  Flomax 0.4 mg oral capsule: 1 cap(s) orally once a day (23 Mar 2024 10:56)  ketorolac 0.5% ophthalmic solution: 1 drop(s) in the left conjunctival sac 4 times a day (23 Mar 2024 11:46)  loperamide 2 mg oral capsule: 1 cap(s) orally once a day as needed for  diarrhea (23 Mar 2024 10:57)  metoprolol succinate 25 mg oral tablet, extended release: 1 tab(s) orally once a day (23 Mar 2024 10:57)  Multiple Vitamins oral tablet: 1 tab(s) orally once a day (23 Mar 2024 10:57)  omeprazole 20 mg oral delayed release capsule: 1 cap(s) orally once a day (23 Mar 2024 10:57)    Hospital Medications:  atorvastatin 10 milliGRAM(s) Oral at bedtime  fenofibrate Tablet 145 milliGRAM(s) Oral daily  ketorolac 0.5% Ophthalmic Solution 1 Drop(s) Both EYES four times a day  pantoprazole Infusion 8 mG/Hr IV Continuous <Continuous>  tamsulosin 0.4 milliGRAM(s) Oral at bedtime    PMHX/PSHX:  Coronary Artery Bypass Surgery    BPH (Benign Prostatic Hyperplasia)    Hyperlipidemia    HTN (Hypertension)    CAD (Coronary Artery Disease)    Lumbar Spinal Stenosis    CBG (Coronary Bypass Graft)    Knee Joint Replacement    H/O hemorrhoidectomy    Arteriovenous malformation (AVM)    Family history:  Family history of CHF (congestive heart failure) (Mother)    Denies family history of colon cancer/polyps, stomach cancer/polyps, pancreatic cancer/masses, liver cancer/disease, ovarian cancer and endometrial cancer.    Social History:   Tob: Denies  EtOH: Denies  Illicit Drugs: Denies    ROS:   General:  No wt loss, fevers, chills  ENT:  No sore throat  CV:  No pain, palpitations  Pulm:  No dyspnea, cough  GI:  see HPI  :  No pain, bleeding  Muscle:  No pain, weakness  Neuro:  No weakness  Psych:  No fatigue  Endocrine:  No polyuria, polydipsia  Heme:  No petechiae  Skin:  No rash    PHYSICAL EXAM:   GENERAL:  No acute distress  HEENT:  NCAT, no scleral icterus   CHEST:  no respiratory distress  HEART:  Regular rate and rhythm  ABDOMEN:  Soft, non-tender, non-distended  EXTREMITIES: No edema  SKIN:  No rash/erythema/ecchymoses  NEURO:  Alert and oriented x 3    Vital Signs:  Vital Signs Last 24 Hrs  T(C): 36.7 (23 Mar 2024 11:30), Max: 36.7 (23 Mar 2024 11:30)  T(F): 98 (23 Mar 2024 11:30), Max: 98 (23 Mar 2024 11:30)  HR: 70 (23 Mar 2024 13:16) (70 - 74)  BP: 123/60 (23 Mar 2024 13:16) (123/60 - 154/77)  BP(mean): --  RR: 16 (23 Mar 2024 13:16) (15 - 20)  SpO2: 100% (23 Mar 2024 13:16) (97% - 100%)    Parameters below as of 23 Mar 2024 13:16  Patient On (Oxygen Delivery Method): room air      Daily Height in cm: 167.64 (23 Mar 2024 08:46)    Daily     LABS:                        9.2    5.13  )-----------( 178      ( 23 Mar 2024 09:24 )             29.7     Mean Cell Volume: 100.7 fl (03-23-24 @ 09:24)    03-23    141  |  106  |  29<H>  ----------------------------<  120<H>  4.6   |  20<L>  |  1.19    Ca    9.0      23 Mar 2024 09:24    TPro  6.7  /  Alb  3.6  /  TBili  0.3  /  DBili  x   /  AST  22  /  ALT  13  /  AlkPhos  46  03-23    LIVER FUNCTIONS - ( 23 Mar 2024 09:24 )  Alb: 3.6 g/dL / Pro: 6.7 g/dL / ALK PHOS: 46 U/L / ALT: 13 U/L / AST: 22 U/L / GGT: x           PT/INR - ( 23 Mar 2024 09:24 )   PT: 11.8 sec;   INR: 1.07 ratio         PTT - ( 23 Mar 2024 09:24 )  PTT:28.5 sec  Urinalysis Basic - ( 23 Mar 2024 11:33 )    Color: Yellow / Appearance: Clear / SG: >1.030 / pH: x  Gluc: x / Ketone: Negative mg/dL  / Bili: Negative / Urobili: 0.2 mg/dL   Blood: x / Protein: Negative mg/dL / Nitrite: Negative   Leuk Esterase: Negative / RBC: x / WBC x   Sq Epi: x / Non Sq Epi: x / Bacteria: x      Amylase Serum--      Lipase serum40       Ammonia--                          9.2    5.13  )-----------( 178      ( 23 Mar 2024 09:24 )             29.7       Imaging:      ACC: [id number] EXAM:  CT ABDOMEN AND PELVIS IC   ORDERED BY: JOSE WATSON     PROCEDURE DATE:  03/23/2024          INTERPRETATION:  CLINICAL INFORMATION: Rectal bleeding.    COMPARISON: CT abdomen pelvis dated 10/9/2015.    CONTRAST/COMPLICATIONS:  IV Contrast: Omnipaque 350  90 cc administered   10 cc discarded  Oral Contrast: NONE  Complications: None reported at time of study completion    PROCEDURE:  CT of the Abdomen and Pelvis was performed.  Precontrast, Arterial and Delayed phases were performed.  Sagittal and coronal reformats were performed.    FINDINGS:  LOWER CHEST: Right lower lobe pulmonary nodules. Reference lesion (16,   11) measures 1.3 cm, unchanged.    LIVER: Within normal limits.  BILE DUCTS: Normal caliber.  GALLBLADDER: Several small gallstones.  SPLEEN: Within normal limits.  PANCREAS: Cyst in the pancreatic head (16, 48) measures 3.0 cm,   previously 1.2 cm. No pancreatic ductal dilatation.  ADRENALS: Within normal limits.  KIDNEYS/URETERS: Simple right renalcortical cyst. No hydronephrosis.    BLADDER: Within normal limits.  REPRODUCTIVE ORGANS: Prostate within normal limits.    BOWEL: No bowel obstruction. Colonic diverticula. Intraluminal contrast   extravasation seen on arterial and venous phases of enhancement in the   ascending colon (9, 71) consistent with active hemorrhage. Appendix not   visualized.  PERITONEUM: No ascites. Rim calcified lesion in the left lower quadrant   (3, 111) possibly representing infarcted epiploic appendage.  VESSELS: Atheromatous calcifications.  RETROPERITONEUM/LYMPH NODES: No lymphadenopathy. Retroperitoneal   lipomatosis.  ABDOMINAL WALL: Bilateral fat-containing inguinal hernias.  BONES: Levoscoliosis lumbar spine with accompanying degenerative change.    IMPRESSION:  Active gastrointestinal hemorrhage in the ascending colon, possibly   diverticular in origin.    Interval enlargement of 3 cm cyst in the pancreatic head. Consider   elective MRI evaluation.    Stable right lower lobe pulmonary nodules.    Examination findings were conveyed to physician's assistant Paolo by   Dr. Mendoza at 1150 hours on 3/23/2024 with read back.    --- End of Report ---      TODD MENDOZA MD; Attending Radiologist  This document has been electronically signed. Mar 23 2024 11:50AM

## 2024-03-23 NOTE — CONSULT NOTE ADULT - SUBJECTIVE AND OBJECTIVE BOX
Patient:  SIMA POWELL  [id number]    CHIEF COMPLAINT: melena    HPI: 89-year-old male history of CAD status post quadruple bypass on Aggrenox, HTN, HLD, AVM presents with rectal bleeding.  Patient reports 1 day of blood in stools initially had 1 episode of bright red blood streaking stools since then has had several episodes of loose dark foul-smelling stools.  Last took Aggrenox last night.  Patient wife reports he has been lethargic the last 2 days.  Patient denies abdominal pain, nausea, vomiting, hematemesis, hematuria, bruising, chest pain or shortness of breath, weight changes, urinary symptoms.  Last colonoscopy/endoscopy over 10 years ago when as per patient he has an experimental "stent" placed in the colon, however upon clarification it was a dural stent placed for non-GI related issue. No current symptoms     Pt reports he had heart surgery 25 years ago and reports Aggrenox was started by neurology iso stroke that was found on imaging. Pt reports he had AVM in 2015 and is s/p stent that was placed in his colon, but is unsure of details. Pt denies any sx at this time.     PAST MEDICAL & SURGICAL HISTORY:  Coronary Artery Bypass Surgery  1998      BPH (Benign Prostatic Hyperplasia)      Hyperlipidemia      HTN (Hypertension)      CAD (Coronary Artery Disease)      Lumbar Spinal Stenosis      CBG (Coronary Bypass Graft)      Knee Joint Replacement      H/O hemorrhoidectomy      Arteriovenous malformation (AVM)  likely history of in [place]. patient is not 100% sure          FAMILY HISTORY:  Family history of CHF (congestive heart failure) (Mother)        SOCIAL HISTORY:    Allergies    penicillins (Vomiting)    Intolerances        HOME MEDICATIONS:    REVIEW OF SYSTEMS:  [ ] Unable to assess ROS because ______  [X] Negative except as stated in HPI  CONSTITUTIONAL: No fever, chills, night sweats, or fatigue  EYES: No eye pain, visual disturbances, or discharge  ENMT:  No difficulty hearing, tinnitus, vertigo; No sinus or throat pain  NECK: No pain or stiffness  BREASTS: No pain, masses, or nipple discharge  RESPIRATORY: No cough, wheezing, or hemoptysis; No shortness of breath  CARDIOVASCULAR: No chest pain, palpitations, dizziness, or leg swelling  GASTROINTESTINAL: +1 bloody BM  GENITOURINARY: No dysuria, frequency, hematuria, or incontinence  NEUROLOGICAL: No headaches, memory loss, loss of strength, numbness, or tremors  SKIN: No itching, burning, rashes, or lesions   LYMPH NODES: No enlarged glands  ENDOCRINE: No heat or cold intolerance; No hair loss  MUSCULOSKELETAL: No joint pain or swelling; No muscle, back, or extremity pain  PSYCHIATRIC: No depression, anxiety, mood swings, or difficulty sleeping  HEME/LYMPH: No easy bruising, or bleeding gums  ALLERGY AND IMMUNOLOGIC: No hives or eczema    OBJECTIVE:  T(F): 98.6 (03-23-24 @ 18:15), Max: 98.6 (03-23-24 @ 18:15)  HR: 80 (03-23-24 @ 18:15) (60 - 80)  BP: 156/70 (03-23-24 @ 18:15) (123/60 - 156/70)  BP(mean): --  ABP: --  ABP(mean): --  RR: 18 (03-23-24 @ 18:15) (15 - 20)  SpO2: 96% (03-23-24 @ 18:15) (96% - 100%)    I/O Summary 24H    CAPILLARY BLOOD GLUCOSE          PHYSICAL EXAM:  GENERAL: NAD, lying in bed comfortably  HEAD:  Atraumatic, Normocephalic  EYES: EOMI, PERRLA, conjunctiva and sclera clear  ENT: Moist mucous membranes  NECK: Supple, No JVD  CHEST/LUNG: Clear to auscultation bilaterally; No rales, rhonchi, wheezing, or rubs. Unlabored respirations  HEART: Regular rate and rhythm; No murmurs, rubs, or gallops  ABDOMEN: Bowel sounds present; Soft, Nontender, Nondistended. No hepatomegaly  EXTREMITIES:  2+ Peripheral Pulses, brisk capillary refill. No clubbing, cyanosis, or edema  NERVOUS SYSTEM:  Alert & Oriented X3, speech clear. No deficits   MSK: FROM all 4 extremities, full and equal strength  SKIN: No rashes or lesions    HOSPITAL MEDICATIONS:  MEDICATIONS  (STANDING):  atorvastatin 10 milliGRAM(s) Oral at bedtime  fenofibrate Tablet 145 milliGRAM(s) Oral daily  ketorolac 0.5% Ophthalmic Solution 1 Drop(s) Both EYES four times a day  pantoprazole Infusion 8 mG/Hr (10 mL/Hr) IV Continuous <Continuous>  tamsulosin 0.4 milliGRAM(s) Oral at bedtime    MEDICATIONS  (PRN):      LABS:  CBC 03-23-24 @ 18:37                        8.3    4.64  )-----------( 154                   25.9       Hgb trend: 8.3 <-- , 7.9 <-- , 9.2 <--   WBC trend: 4.64 <-- , 5.11 <-- , 5.13 <--     CMP 03-23-24 @ 09:24    141  |  106  |  29<H>  ----------------------------<  120<H>  4.6   |  20<L>  |  1.19    Ca    9.0      03-23-24 @ 09:24    TPro  6.7  /  Alb  3.6  /  TBili  0.3  /  DBili  x   /  AST  22  /  ALT  13  /  AlkPhos  46  03-23      Serum Cr trend: 1.19 <--   PT/INR - ( 23 Mar 2024 09:24 )   PT: 11.8 sec;   INR: 1.07 ratio         PTT - ( 23 Mar 2024 09:24 ):28.5 sec    ABG Trend:     VBG Trend:   03-23-24 @ 09:14 - pH: 7.36  | pCO2: 44    | pO2: 42    | HCO3: 25    | Lactate: 1.7        MICROBIOLOGY:       RADIOLOGY:  [ ] Reviewed and interpreted by me    EKG

## 2024-03-23 NOTE — H&P ADULT - PROBLEM SELECTOR PLAN 1
active bleeding noted in ascending colon on CTA  IR consultation requested  GI has been called by outpatient GI already per Emergency Department attending   hemodynamically stable  hold aggrenox and all BP meds  repeat stat CBC  transfusion if < 9 one unit since bleeding is active and patient has h/o CAD  NPO for now

## 2024-03-23 NOTE — CONSULT NOTE ADULT - ATTENDING COMMENTS
89M Hx HTN, CAD s/p CABG on Aggrenox, Recurrent LGI Bleed, AVM p/w BRBPR witih CTA +ve Ascending Colonic Bleed seen and GI and IR Consults plan for Endoscopic Procedure coming Monday.   - Hemodynamically stable with RAO2  - Hb 9.2 dropped to 7.9 and received 1 PRBC with Hb 8.3   - No urgent GI/IR Intervention plans offered   - No MICU Monitoring/Intervention indicated 89M Hx HTN, CAD s/p CABG on Aggrenox, Recurrent LGI Bleed, AVM p/w BRBPR witih CTA +ve Ascending Colonic Bleed seen and GI and IR Consults plan for Endoscopic Procedure coming Monday.   - Hemodynamically stable with RAO2  - Hb 9.2 dropped to 7.9 and received 1 PRBC with Hb 8.3   - No urgent GI/IR Intervention plans offered   - No MICU Monitoring/Intervention indicated     Patient seen and examined with ICU Resident/Fellow at bedside after lab data, medical records and radiology reports reviewed. I have read and agreeable in general with resident's Documented Note, Assessment and Management Plan which reflected my opinions from bedside round and discussion.

## 2024-03-23 NOTE — ED ADULT NURSE REASSESSMENT NOTE - NS ED NURSE REASSESS COMMENT FT1
RBC started at 1410 over two hours. Consent in chart. Risks and benefits explained to patient. Patient verbalized understanding of risks and benefits. Patient aware of possible side effects. Vital signs stable. Second RN at bedside for confirmation.

## 2024-03-23 NOTE — H&P ADULT - NSHPLABSRESULTS_GEN_ALL_CORE
noted on [facility], including lab results and radiology studies   CTA positive for active bleeding colon

## 2024-03-24 LAB
A1C WITH ESTIMATED AVERAGE GLUCOSE RESULT: 5.8 % — HIGH (ref 4–5.6)
ADD ON TEST-SPECIMEN IN LAB: SIGNIFICANT CHANGE UP
ANION GAP SERPL CALC-SCNC: 13 MMOL/L — SIGNIFICANT CHANGE UP (ref 5–17)
BUN SERPL-MCNC: 21 MG/DL — SIGNIFICANT CHANGE UP (ref 7–23)
CALCIUM SERPL-MCNC: 8.7 MG/DL — SIGNIFICANT CHANGE UP (ref 8.4–10.5)
CHLORIDE SERPL-SCNC: 108 MMOL/L — SIGNIFICANT CHANGE UP (ref 96–108)
CO2 SERPL-SCNC: 21 MMOL/L — LOW (ref 22–31)
CREAT SERPL-MCNC: 1.15 MG/DL — SIGNIFICANT CHANGE UP (ref 0.5–1.3)
EGFR: 60 ML/MIN/1.73M2 — SIGNIFICANT CHANGE UP
ESTIMATED AVERAGE GLUCOSE: 120 MG/DL — HIGH (ref 68–114)
GLUCOSE SERPL-MCNC: 103 MG/DL — HIGH (ref 70–99)
HCT VFR BLD CALC: 25.1 % — LOW (ref 39–50)
HCT VFR BLD CALC: 25.5 % — LOW (ref 39–50)
HCT VFR BLD CALC: 25.6 % — LOW (ref 39–50)
HCT VFR BLD CALC: 28.5 % — LOW (ref 39–50)
HGB BLD-MCNC: 8.1 G/DL — LOW (ref 13–17)
HGB BLD-MCNC: 8.2 G/DL — LOW (ref 13–17)
HGB BLD-MCNC: 8.3 G/DL — LOW (ref 13–17)
HGB BLD-MCNC: 9.2 G/DL — LOW (ref 13–17)
IRON SATN MFR SERPL: 258 UG/DL — HIGH (ref 45–165)
IRON SATN MFR SERPL: 81 % — HIGH (ref 16–55)
MCHC RBC-ENTMCNC: 30.5 PG — SIGNIFICANT CHANGE UP (ref 27–34)
MCHC RBC-ENTMCNC: 30.9 PG — SIGNIFICANT CHANGE UP (ref 27–34)
MCHC RBC-ENTMCNC: 31.1 PG — SIGNIFICANT CHANGE UP (ref 27–34)
MCHC RBC-ENTMCNC: 31.4 PG — SIGNIFICANT CHANGE UP (ref 27–34)
MCHC RBC-ENTMCNC: 31.6 GM/DL — LOW (ref 32–36)
MCHC RBC-ENTMCNC: 32.3 GM/DL — SIGNIFICANT CHANGE UP (ref 32–36)
MCHC RBC-ENTMCNC: 32.5 GM/DL — SIGNIFICANT CHANGE UP (ref 32–36)
MCHC RBC-ENTMCNC: 32.7 GM/DL — SIGNIFICANT CHANGE UP (ref 32–36)
MCV RBC AUTO: 94.8 FL — SIGNIFICANT CHANGE UP (ref 80–100)
MCV RBC AUTO: 95.1 FL — SIGNIFICANT CHANGE UP (ref 80–100)
MCV RBC AUTO: 96.2 FL — SIGNIFICANT CHANGE UP (ref 80–100)
MCV RBC AUTO: 97.3 FL — SIGNIFICANT CHANGE UP (ref 80–100)
NRBC # BLD: 0 /100 WBCS — SIGNIFICANT CHANGE UP (ref 0–0)
NT-PROBNP SERPL-SCNC: 880 PG/ML — HIGH (ref 0–300)
PLATELET # BLD AUTO: 168 K/UL — SIGNIFICANT CHANGE UP (ref 150–400)
PLATELET # BLD AUTO: 169 K/UL — SIGNIFICANT CHANGE UP (ref 150–400)
PLATELET # BLD AUTO: 170 K/UL — SIGNIFICANT CHANGE UP (ref 150–400)
PLATELET # BLD AUTO: 173 K/UL — SIGNIFICANT CHANGE UP (ref 150–400)
POTASSIUM SERPL-MCNC: 3.9 MMOL/L — SIGNIFICANT CHANGE UP (ref 3.5–5.3)
POTASSIUM SERPL-SCNC: 3.9 MMOL/L — SIGNIFICANT CHANGE UP (ref 3.5–5.3)
RBC # BLD: 2.64 M/UL — LOW (ref 4.2–5.8)
RBC # BLD: 2.66 M/UL — LOW (ref 4.2–5.8)
RBC # BLD: 2.69 M/UL — LOW (ref 4.2–5.8)
RBC # BLD: 2.93 M/UL — LOW (ref 4.2–5.8)
RBC # FLD: 17.4 % — HIGH (ref 10.3–14.5)
RBC # FLD: 17.7 % — HIGH (ref 10.3–14.5)
RBC # FLD: 17.8 % — HIGH (ref 10.3–14.5)
RBC # FLD: 18.1 % — HIGH (ref 10.3–14.5)
SODIUM SERPL-SCNC: 142 MMOL/L — SIGNIFICANT CHANGE UP (ref 135–145)
TIBC SERPL-MCNC: 319 UG/DL — SIGNIFICANT CHANGE UP (ref 220–430)
UIBC SERPL-MCNC: 61 UG/DL — LOW (ref 110–370)
WBC # BLD: 4.24 K/UL — SIGNIFICANT CHANGE UP (ref 3.8–10.5)
WBC # BLD: 4.52 K/UL — SIGNIFICANT CHANGE UP (ref 3.8–10.5)
WBC # BLD: 4.98 K/UL — SIGNIFICANT CHANGE UP (ref 3.8–10.5)
WBC # BLD: 5.72 K/UL — SIGNIFICANT CHANGE UP (ref 3.8–10.5)
WBC # FLD AUTO: 4.24 K/UL — SIGNIFICANT CHANGE UP (ref 3.8–10.5)
WBC # FLD AUTO: 4.52 K/UL — SIGNIFICANT CHANGE UP (ref 3.8–10.5)
WBC # FLD AUTO: 4.98 K/UL — SIGNIFICANT CHANGE UP (ref 3.8–10.5)
WBC # FLD AUTO: 5.72 K/UL — SIGNIFICANT CHANGE UP (ref 3.8–10.5)

## 2024-03-24 PROCEDURE — 71045 X-RAY EXAM CHEST 1 VIEW: CPT | Mod: 26

## 2024-03-24 PROCEDURE — 93010 ELECTROCARDIOGRAM REPORT: CPT

## 2024-03-24 RX ORDER — SOD SULF/SODIUM/NAHCO3/KCL/PEG
4000 SOLUTION, RECONSTITUTED, ORAL ORAL ONCE
Refills: 0 | Status: COMPLETED | OUTPATIENT
Start: 2024-03-24 | End: 2024-03-24

## 2024-03-24 RX ORDER — PANTOPRAZOLE SODIUM 20 MG/1
40 TABLET, DELAYED RELEASE ORAL EVERY 24 HOURS
Refills: 0 | Status: DISCONTINUED | OUTPATIENT
Start: 2024-03-25 | End: 2024-03-27

## 2024-03-24 RX ADMIN — TAMSULOSIN HYDROCHLORIDE 0.4 MILLIGRAM(S): 0.4 CAPSULE ORAL at 21:05

## 2024-03-24 RX ADMIN — Medication 1 DROP(S): at 11:39

## 2024-03-24 RX ADMIN — Medication 1 DROP(S): at 05:08

## 2024-03-24 RX ADMIN — Medication 1 DROP(S): at 23:23

## 2024-03-24 RX ADMIN — ATORVASTATIN CALCIUM 10 MILLIGRAM(S): 80 TABLET, FILM COATED ORAL at 21:05

## 2024-03-24 RX ADMIN — Medication 145 MILLIGRAM(S): at 11:38

## 2024-03-24 RX ADMIN — Medication 1 DROP(S): at 18:09

## 2024-03-24 RX ADMIN — Medication 4000 MILLILITER(S): at 18:07

## 2024-03-24 NOTE — PROVIDER CONTACT NOTE (OTHER) - ASSESSMENT
Pt is receiving PRBC's transfusion and reports chest pressure and intermittent shortness of breath. /72 HR 65 Temp 37 C , 95% RA.

## 2024-03-24 NOTE — CHART NOTE - NSCHARTNOTEFT_GEN_A_CORE
Spoke with patient at bedside. Last BM this morning, blood mixed with brown stool. Bowel movements have been decreasing in frequency. Hgb stable today, VSS, abd soft and nontender.    Will proceed with colonoscopy tomorrow pending medical optimization.    Please document cardiology clearance/medical optimization for sedated procedure.    CLD today, NPO at MN, start bowel prep at 5 pm (ordered). Please obtain early morning labs tomorrow (4 am CBC, BMP, coags) to expedite am anesthesia evaluation.

## 2024-03-24 NOTE — CHART NOTE - NSCHARTNOTEFT_GEN_A_CORE
Medicine PA Note     SIMA POWELL  [id number]  Allergies    penicillins (Vomiting)    Intolerances    Notified by RN pt c/o chest pressure and intermittent SOB for about an hour. Pt was receiving their second unit of blood when these symptoms were verbalized. Pt has not experienced this before. The RN sat him up more in the bed and pt stated he started to feel much better. Pt denies headache, chest pain, n/v/d, weakness, dizziness, lightheadedness.     Vital Signs Last 24 Hrs  T(C): 37 (03-23-24 @ 22:13), Max: 37.1 (03-23-24 @ 19:57)  T(F): 98.6 (03-23-24 @ 22:13), Max: 98.7 (03-23-24 @ 19:57)  HR: 65 (03-23-24 @ 22:13) (60 - 80)  BP: 163/72 (03-23-24 @ 22:13) (123/60 - 163/72)  BP(mean): --  RR: 16 (03-23-24 @ 22:13) (15 - 20)  SpO2: 95% (03-23-24 @ 22:13) (95% - 100%)                        8.3    4.98  )-----------( 173      ( 23 Mar 2024 23:48 )             25.5     03-23    141  |  106  |  29<H>  ----------------------------<  120<H>  4.6   |  20<L>  |  1.19    Ca    9.0      23 Mar 2024 09:24    TPro  6.7  /  Alb  3.6  /  TBili  0.3  /  DBili  x   /  AST  22  /  ALT  13  /  AlkPhos  46  03-23    PT/INR - ( 23 Mar 2024 09:24 )   PT: 11.8 sec;   INR: 1.07 ratio         PTT - ( 23 Mar 2024 09:24 )  PTT:28.5 sec      PHYSICAL EXAM:  GENERAL: NAD, well-developed  CHEST/LUNG: Clear to auscultation bilaterally; No wheezes, rhonchi or rales appreciated  HEART: Regular rate and rhythm; No murmurs, rubs, or gallops  ABDOMEN: Soft, Nontender, Nondistended; Bowel sounds present. No rebound, or guarding noted.  EXTREMITIES:  2+ Peripheral Pulses, No clubbing, cyanosis, or edema; pt w/ LLE swelling but is not new per pt       Assessment/Plan: HPI:  89-year-old male history of CAD status post quadruple bypass on Aggrenox, HTN, HLD, AVM presents with rectal bleeding.  Patient reports 1 day of blood in stools initially had 1 episode of bright red blood streaking stools since then has had several episodes of loose dark foul-smelling stools.  Last took Aggrenox last night.  Patient wife reports he has been lethargic the last 2 days.  Patient denies abdominal pain, nausea, vomiting, hematemesis, hematuria, bruising, chest pain or shortness of breath, weight changes, urinary symptoms.  Last colonoscopy/endoscopy over 10 years ago when as per patient he has an experimental "stent" placed in the colon, however upon clarification it was a dural stent placed for non-GI related issue. No current symptoms. Hospital course c/b acute GI bleed in ascending colon. Pt s/p 1U platelets and PRBCs.     Now p/w chest pressure and intermittent SOB.     #Chest pressure/intermittent SOB 2/2 acute blood loss anemia vs fluid overload   > VS hemodynamically stable   > Blood transfusion stopped   > EKG done showing no significant changes from previous (3/23 AM)   > STAT CBC and urgent CXR ordered per attending Dr. Joseph  > If pt develops acute SOB and/or tachypnea, administer lasix 20 mg IVP x1 per Dr. Joseph   > Will continue to closely monitor and reassess   > Will endorse to AM team, attending to follow    Sergey Holguin PA-C,   Department of Medicine Medicine PA Note     SIMA POWELL  MRN-[id number]  Allergies    penicillins (Vomiting)    Intolerances    Notified by RN pt c/o chest pressure and intermittent SOB for about an hour. Pt was receiving their second unit of blood when these symptoms were verbalized. Pt has not experienced this before. The RN sat him up more in the bed and pt stated he started to feel much better. Pt denies headache, chest pain, n/v/d, weakness, dizziness, lightheadedness.     Vital Signs Last 24 Hrs  T(C): 37 (03-23-24 @ 22:13), Max: 37.1 (03-23-24 @ 19:57)  T(F): 98.6 (03-23-24 @ 22:13), Max: 98.7 (03-23-24 @ 19:57)  HR: 65 (03-23-24 @ 22:13) (60 - 80)  BP: 163/72 (03-23-24 @ 22:13) (123/60 - 163/72)  BP(mean): --  RR: 16 (03-23-24 @ 22:13) (15 - 20)  SpO2: 95% (03-23-24 @ 22:13) (95% - 100%)                        8.3    4.98  )-----------( 173      ( 23 Mar 2024 23:48 )             25.5     03-23    141  |  106  |  29<H>  ----------------------------<  120<H>  4.6   |  20<L>  |  1.19    Ca    9.0      23 Mar 2024 09:24    TPro  6.7  /  Alb  3.6  /  TBili  0.3  /  DBili  x   /  AST  22  /  ALT  13  /  AlkPhos  46  03-23    PT/INR - ( 23 Mar 2024 09:24 )   PT: 11.8 sec;   INR: 1.07 ratio         PTT - ( 23 Mar 2024 09:24 )  PTT:28.5 sec      PHYSICAL EXAM:  GENERAL: NAD, well-developed  CHEST/LUNG: Clear to auscultation bilaterally; No wheezes, rhonchi or rales appreciated  HEART: Regular rate and rhythm; No murmurs, rubs, or gallops  ABDOMEN: Soft, Nontender, Nondistended; Bowel sounds present. No rebound, or guarding noted.  EXTREMITIES:  2+ Peripheral Pulses, No clubbing, cyanosis, or edema; pt w/ LLE swelling but is not new per pt       Assessment/Plan: HPI:  89-year-old male history of CAD status post quadruple bypass on Aggrenox, HTN, HLD, AVM presents with rectal bleeding.  Patient reports 1 day of blood in stools initially had 1 episode of bright red blood streaking stools since then has had several episodes of loose dark foul-smelling stools.  Last took Aggrenox last night.  Patient wife reports he has been lethargic the last 2 days.  Patient denies abdominal pain, nausea, vomiting, hematemesis, hematuria, bruising, chest pain or shortness of breath, weight changes, urinary symptoms.  Last colonoscopy/endoscopy over 10 years ago when as per patient he has an experimental "stent" placed in the colon, however upon clarification it was a dural stent placed for non-GI related issue. No current symptoms. Hospital course c/b acute GI bleed in ascending colon. Pt s/p 1U platelets and PRBCs.     Now p/w chest pressure and intermittent SOB.     #Chest pressure/intermittent SOB 2/2 acute blood loss anemia vs fluid overload   > VS hemodynamically stable   > Blood transfusion stopped   > EKG done showing no significant changes from previous (3/23 AM)   > STAT CBC and urgent CXR ordered per attending Dr. Joseph  > If pt develops acute SOB and/or tachypnea, administer lasix 20 mg IVP x1 per Dr. Joseph   > Will continue to closely monitor and reassess   > Will endorse to AM team, attending to follow    Sergey Holguin PA-C,   Department of Medicine      ADDENDUM: CBC w/ hgb of 8.3 (no change) and prelim CXR w/ no focal consolidations. Pt and vitals remain hemodynamically stable. Notified by RN pt w/ afib on tele which is new for the pt. HR is currently controlled in the 60s. Attending Dr. Joseph made aware. Will continue to monitor and reassess.

## 2024-03-25 LAB
ANION GAP SERPL CALC-SCNC: 12 MMOL/L — SIGNIFICANT CHANGE UP (ref 5–17)
APTT BLD: 28.5 SEC — SIGNIFICANT CHANGE UP (ref 24.5–35.6)
BUN SERPL-MCNC: 15 MG/DL — SIGNIFICANT CHANGE UP (ref 7–23)
CALCIUM SERPL-MCNC: 8.5 MG/DL — SIGNIFICANT CHANGE UP (ref 8.4–10.5)
CHLORIDE SERPL-SCNC: 105 MMOL/L — SIGNIFICANT CHANGE UP (ref 96–108)
CO2 SERPL-SCNC: 23 MMOL/L — SIGNIFICANT CHANGE UP (ref 22–31)
CREAT SERPL-MCNC: 1.11 MG/DL — SIGNIFICANT CHANGE UP (ref 0.5–1.3)
EGFR: 63 ML/MIN/1.73M2 — SIGNIFICANT CHANGE UP
FERRITIN SERPL-MCNC: 52 NG/ML — SIGNIFICANT CHANGE UP (ref 30–400)
FOLATE SERPL-MCNC: >20 NG/ML — SIGNIFICANT CHANGE UP
GLUCOSE SERPL-MCNC: 114 MG/DL — HIGH (ref 70–99)
HCT VFR BLD CALC: 24.8 % — LOW (ref 39–50)
HCT VFR BLD CALC: 26.6 % — LOW (ref 39–50)
HGB BLD-MCNC: 8.1 G/DL — LOW (ref 13–17)
HGB BLD-MCNC: 8.6 G/DL — LOW (ref 13–17)
INR BLD: 1.12 RATIO — SIGNIFICANT CHANGE UP (ref 0.85–1.18)
MAGNESIUM SERPL-MCNC: 1.6 MG/DL — SIGNIFICANT CHANGE UP (ref 1.6–2.6)
MCHC RBC-ENTMCNC: 30.9 PG — SIGNIFICANT CHANGE UP (ref 27–34)
MCHC RBC-ENTMCNC: 31 PG — SIGNIFICANT CHANGE UP (ref 27–34)
MCHC RBC-ENTMCNC: 32.3 GM/DL — SIGNIFICANT CHANGE UP (ref 32–36)
MCHC RBC-ENTMCNC: 32.7 GM/DL — SIGNIFICANT CHANGE UP (ref 32–36)
MCV RBC AUTO: 95 FL — SIGNIFICANT CHANGE UP (ref 80–100)
MCV RBC AUTO: 95.7 FL — SIGNIFICANT CHANGE UP (ref 80–100)
NRBC # BLD: 0 /100 WBCS — SIGNIFICANT CHANGE UP (ref 0–0)
NRBC # BLD: 0 /100 WBCS — SIGNIFICANT CHANGE UP (ref 0–0)
PHOSPHATE SERPL-MCNC: 3.3 MG/DL — SIGNIFICANT CHANGE UP (ref 2.5–4.5)
PLATELET # BLD AUTO: 157 K/UL — SIGNIFICANT CHANGE UP (ref 150–400)
PLATELET # BLD AUTO: 172 K/UL — SIGNIFICANT CHANGE UP (ref 150–400)
POTASSIUM SERPL-MCNC: 3.8 MMOL/L — SIGNIFICANT CHANGE UP (ref 3.5–5.3)
POTASSIUM SERPL-SCNC: 3.8 MMOL/L — SIGNIFICANT CHANGE UP (ref 3.5–5.3)
PROTHROM AB SERPL-ACNC: 12.3 SEC — SIGNIFICANT CHANGE UP (ref 9.5–13)
RBC # BLD: 2.61 M/UL — LOW (ref 4.2–5.8)
RBC # BLD: 2.78 M/UL — LOW (ref 4.2–5.8)
RBC # FLD: 17.1 % — HIGH (ref 10.3–14.5)
RBC # FLD: 17.2 % — HIGH (ref 10.3–14.5)
SODIUM SERPL-SCNC: 140 MMOL/L — SIGNIFICANT CHANGE UP (ref 135–145)
TSH SERPL-MCNC: 1.14 UIU/ML — SIGNIFICANT CHANGE UP (ref 0.27–4.2)
VIT B12 SERPL-MCNC: 583 PG/ML — SIGNIFICANT CHANGE UP (ref 232–1245)
WBC # BLD: 4.03 K/UL — SIGNIFICANT CHANGE UP (ref 3.8–10.5)
WBC # BLD: 4.36 K/UL — SIGNIFICANT CHANGE UP (ref 3.8–10.5)
WBC # FLD AUTO: 4.03 K/UL — SIGNIFICANT CHANGE UP (ref 3.8–10.5)
WBC # FLD AUTO: 4.36 K/UL — SIGNIFICANT CHANGE UP (ref 3.8–10.5)

## 2024-03-25 PROCEDURE — 88305 TISSUE EXAM BY PATHOLOGIST: CPT | Mod: 26

## 2024-03-25 PROCEDURE — 45378 DIAGNOSTIC COLONOSCOPY: CPT | Mod: GC

## 2024-03-25 DEVICE — NET RETRV ROT ROTH 2.5MMX230CM: Type: IMPLANTABLE DEVICE | Status: FUNCTIONAL

## 2024-03-25 RX ORDER — ACETAMINOPHEN 500 MG
650 TABLET ORAL EVERY 6 HOURS
Refills: 0 | Status: DISCONTINUED | OUTPATIENT
Start: 2024-03-25 | End: 2024-03-27

## 2024-03-25 RX ADMIN — Medication 650 MILLIGRAM(S): at 22:44

## 2024-03-25 RX ADMIN — Medication 1 DROP(S): at 05:52

## 2024-03-25 RX ADMIN — ATORVASTATIN CALCIUM 10 MILLIGRAM(S): 80 TABLET, FILM COATED ORAL at 21:15

## 2024-03-25 RX ADMIN — TAMSULOSIN HYDROCHLORIDE 0.4 MILLIGRAM(S): 0.4 CAPSULE ORAL at 21:16

## 2024-03-25 RX ADMIN — Medication 145 MILLIGRAM(S): at 18:45

## 2024-03-25 RX ADMIN — PANTOPRAZOLE SODIUM 40 MILLIGRAM(S): 20 TABLET, DELAYED RELEASE ORAL at 05:52

## 2024-03-25 RX ADMIN — Medication 1 DROP(S): at 11:59

## 2024-03-25 RX ADMIN — Medication 1 DROP(S): at 18:45

## 2024-03-25 NOTE — CONSULT NOTE ADULT - ASSESSMENT
89-year-old male history of CAD status post quadruple bypass on Aggrenox, HTN, HLD, AVM presents with rectal bleeding CTA positive for active colonic bleed likely diverticular in etiology, hemodynamically stable   Patient in optimal condition for procedure.  Recent stress nuclear study shows no ischemia.  There are no cardiac contraindications.  We will follow with you. 89-year-old male history of CAD status post quadruple bypass on Aggrenox, HTN, HLD, AVM presents with rectal bleeding CTA positive for active colonic bleed likely diverticular in etiology, hemodynamically stable   Patient in optimal condition for procedure.  Recent stress nuclear study shows no ischemia.  There are no cardiac contraindications.  We will follow with you.    45 minutes were spent today preparing to see the patient, reviewing history and test results, performing a history and physical exam, ordering appropriate tests, counseling the patient, and documenting. 89-year-old male history of CAD status post quadruple bypass on Aggrenox, HTN, HLD, AVM presents with rectal bleeding CTA positive for active colonic bleed likely diverticular in etiology, hemodynamically stable   Patient in optimal condition for procedure.  Recent stress nuclear study shows no ischemia.  There are no cardiac contraindications.  Patient has had no evidence of angina since the last stress nuclear study.    There is no evidence of CHF  Patient can have GI procedure today.  We will follow with you.    45 minutes were spent today preparing to see the patient, reviewing history and test results, performing a history and physical exam, ordering appropriate tests, counseling the patient, and documenting.

## 2024-03-25 NOTE — CHART NOTE - NSCHARTNOTEFT_GEN_A_CORE
As per primary team, stable to proceed with colonoscopy without an echo.   Will divert patient to endoscopy unit.    Discussed with Dr Greer, Dr Ferguson, and Dr Joseph

## 2024-03-25 NOTE — CONSULT NOTE ADULT - SUBJECTIVE AND OBJECTIVE BOX
CHIEF COMPLAINT: Chest Pain    HPI:  89-year-old male history of CAD status post quadruple bypass on Aggrenox, HTN, HLD, AVM presents with rectal bleeding.  Patient reports 1 day of blood in stools initially had 1 episode of bright red blood streaking stools since then has had several episodes of loose dark foul-smelling stools.  Last took Aggrenox last night.  Patient wife reports he has been lethargic the last 2 days.  Patient denies abdominal pain, nausea, vomiting, hematemesis, hematuria, bruising, chest pain or shortness of breath, weight changes, urinary symptoms.  Last colonoscopy/endoscopy over 10 years ago when as per patient he has an experimental "stent" placed in the colon, however upon clarification it was a dural stent placed for non-GI related issue. No current symptoms  (23 Mar 2024 12:31)  Currently denies chest pain or SOB.  He is awaiting a colonoscopy today      PAST MEDICAL & SURGICAL HISTORY:  Coronary Artery Bypass Surgery  1998      BPH (Benign Prostatic Hyperplasia)      Hyperlipidemia      HTN (Hypertension)      CAD (Coronary Artery Disease)      Lumbar Spinal Stenosis      CBG (Coronary Bypass Graft)      Knee Joint Replacement      H/O hemorrhoidectomy      Arteriovenous malformation (AVM)  likely history of in [place]. patient is not 100% sure          Allergies    penicillins (Vomiting)    Intolerances        SOCIAL HISTORY    Smoking Hx: None  ETOH Hx: None      FAMILY HISTORY:  Family history of CHF (congestive heart failure) (Mother)        MEDICATIONS:  atorvastatin 10 milliGRAM(s) Oral at bedtime  fenofibrate Tablet 145 milliGRAM(s) Oral daily  ketorolac 0.5% Ophthalmic Solution 1 Drop(s) Both EYES four times a day  pantoprazole  Injectable 40 milliGRAM(s) IV Push every 24 hours  tamsulosin 0.4 milliGRAM(s) Oral at bedtime      REVIEW OF SYSTEMS:    CONSTITUTIONAL: No weakness, fevers or chills  EYES/ENT: No visual changes;  No vertigo or throat pain   NECK: No pain or stiffness  RESPIRATORY: No cough, wheezing, hemoptysis; No shortness of breath  CARDIOVASCULAR: No chest pain or palpitations  GASTROINTESTINAL: No abdominal or epigastric pain. No nausea, vomiting, or hematemesis; No diarrhea or constipation. No melena or hematochezia.  GENITOURINARY: No dysuria, frequency or hematuria  NEUROLOGICAL: No numbness or weakness  SKIN: No itching, burning, rashes, or lesions   All other review of systems is negative unless indicated above    Vital Signs Last 24 Hrs  T(C): 36.5 (25 Mar 2024 08:30), Max: 37 (25 Mar 2024 00:23)  T(F): 97.7 (25 Mar 2024 08:30), Max: 98.6 (25 Mar 2024 00:23)  HR: 57 (25 Mar 2024 08:30) (57 - 72)  BP: 154/69 (25 Mar 2024 08:30) (141/74 - 155/72)  BP(mean): --  RR: 18 (25 Mar 2024 08:30) (18 - 19)  SpO2: 95% (25 Mar 2024 08:30) (95% - 97%)    Parameters below as of 25 Mar 2024 08:30  Patient On (Oxygen Delivery Method): room air        I&O's Summary    24 Mar 2024 07:01  -  25 Mar 2024 07:00  --------------------------------------------------------  IN: 500 mL / OUT: 1330 mL / NET: -830 mL        PHYSICAL EXAM:    Constitutional: NAD, awake and alert, well-developed  HEENT: PERR, EOMI  Neck: soft and supple, No LAD, No JVD  Respiratory: Breath sounds are clear bilaterally, No wheezing, rales or rhonchi  Cardiovascular: Regular rate and rhythm, normal S1 and S2,  no murmurs, gallops or rubs  Gastrointestinal: Bowel Sounds present, soft, nontender.   Extremities: No peripheral edema. No clubbing or cyanosis.  Vascular: 2+ peripheral pulses  Neurological: A/O x 3, no focal deficits  Musculoskeletal: no calf tenderness.  Skin: No rashes.      LABS: All Labs Reviewed:                        8.1    4.03  )-----------( 157      ( 25 Mar 2024 04:20 )             24.8                         9.2    5.72  )-----------( 168      ( 24 Mar 2024 19:38 )             28.5                         8.1    4.52  )-----------( 170      ( 24 Mar 2024 13:20 )             25.6     25 Mar 2024 04:20    140    |  105    |  15     ----------------------------<  114    3.8     |  23     |  1.11   24 Mar 2024 06:58    142    |  108    |  21     ----------------------------<  103    3.9     |  21     |  1.15   23 Mar 2024 09:24    141    |  106    |  29     ----------------------------<  120    4.6     |  20     |  1.19     Ca    8.5        25 Mar 2024 04:20  Ca    8.7        24 Mar 2024 06:58  Ca    9.0        23 Mar 2024 09:24  Phos  3.3       25 Mar 2024 04:20  Mg     1.6       25 Mar 2024 04:20    TPro  6.7    /  Alb  3.6    /  TBili  0.3    /  DBili  x      /  AST  22     /  ALT  13     /  AlkPhos  46     23 Mar 2024 09:24    PT/INR - ( 25 Mar 2024 04:20 )   PT: 12.3 sec;   INR: 1.12 ratio         PTT - ( 25 Mar 2024 04:20 )  PTT:28.5 sec      TSH: Thyroid Stimulating Hormone, Serum: 1.14 uIU/mL (03-25 @ 04:20)            RADIOLOGY/EKG: NSR, RBBB, LAHB, LVH    Nuclear stress test: August 2022.  EF=52%, hypokinetic anterolateral wall.  No scan evidence for ischemia.  Echo: Nov 2022.  EF=63%, mild MR, mild PI, mild LV diastolic dysfunction.  Concentric LVH, LAE

## 2024-03-25 NOTE — PATIENT PROFILE ADULT - FALL HARM RISK - HARM RISK INTERVENTIONS

## 2024-03-25 NOTE — PRE PROCEDURE NOTE - PRE PROCEDURE EVALUATION
Attending Physician: Dr Ferguson                          Procedure: Colonoscopy    Indication for Procedure: GIB   ________________________________________________________  PAST MEDICAL & SURGICAL HISTORY:  Coronary Artery Bypass Surgery  1998      BPH (Benign Prostatic Hyperplasia)      Hyperlipidemia      HTN (Hypertension)      CAD (Coronary Artery Disease)      Lumbar Spinal Stenosis      CBG (Coronary Bypass Graft)      Knee Joint Replacement      H/O hemorrhoidectomy      Arteriovenous malformation (AVM)  likely history of in [place]. patient is not 100% sure        ALLERGIES:  penicillins (Vomiting)    HOME MEDICATIONS:  Aggrenox 25 mg-200 mg oral capsule, extended release: 1 cap(s) orally 2 times a day  atorvastatin 10 mg oral tablet: 1 tab(s) orally once a day (at bedtime)  fenofibrate 160 mg oral tablet: 1 tab(s) orally once a day  Flomax 0.4 mg oral capsule: 1 cap(s) orally once a day  ketorolac 0.5% ophthalmic solution: 1 drop(s) in the left conjunctival sac 4 times a day  loperamide 2 mg oral capsule: 1 cap(s) orally once a day as needed for  diarrhea  metoprolol succinate 25 mg oral tablet, extended release: 1 tab(s) orally once a day  Multiple Vitamins oral tablet: 1 tab(s) orally once a day  omeprazole 20 mg oral delayed release capsule: 1 cap(s) orally once a day    AICD/PPM: [ ] yes   [X ] no    PERTINENT LAB DATA:                        8.6    4.36  )-----------( 172      ( 25 Mar 2024 11:20 )             26.6     03-25    140  |  105  |  15  ----------------------------<  114<H>  3.8   |  23  |  1.11    Ca    8.5      25 Mar 2024 04:20  Phos  3.3     03-25  Mg     1.6     03-25      PT/INR - ( 25 Mar 2024 04:20 )   PT: 12.3 sec;   INR: 1.12 ratio         PTT - ( 25 Mar 2024 04:20 )  PTT:28.5 sec            PHYSICAL EXAMINATION:    Height (cm): 167.6  Weight (kg): 87.1  BMI (kg/m2): 31  BSA (m2): 1.97T(C): 36.7  HR: 62  BP: 150/73  RR: 18  SpO2: 95%    Constitutional: NAD    Neck:  No JVD  Respiratory: no resp distress   Cardiovascular: rrr  Extremities: No peripheral edema  Neurological: A/O x 4, no focal deficits        COMMENTS:    The patient is a suitable candidate for the planned procedure unless box checked [ ]  No, explain:

## 2024-03-26 ENCOUNTER — RESULT REVIEW (OUTPATIENT)
Age: 89
End: 2024-03-26

## 2024-03-26 ENCOUNTER — TRANSCRIPTION ENCOUNTER (OUTPATIENT)
Age: 89
End: 2024-03-26

## 2024-03-26 LAB
ANION GAP SERPL CALC-SCNC: 14 MMOL/L — SIGNIFICANT CHANGE UP (ref 5–17)
BUN SERPL-MCNC: 14 MG/DL — SIGNIFICANT CHANGE UP (ref 7–23)
CALCIUM SERPL-MCNC: 8.5 MG/DL — SIGNIFICANT CHANGE UP (ref 8.4–10.5)
CHLORIDE SERPL-SCNC: 105 MMOL/L — SIGNIFICANT CHANGE UP (ref 96–108)
CO2 SERPL-SCNC: 21 MMOL/L — LOW (ref 22–31)
CREAT SERPL-MCNC: 1.2 MG/DL — SIGNIFICANT CHANGE UP (ref 0.5–1.3)
EGFR: 57 ML/MIN/1.73M2 — LOW
GLUCOSE SERPL-MCNC: 101 MG/DL — HIGH (ref 70–99)
HCT VFR BLD CALC: 25 % — LOW (ref 39–50)
HGB BLD-MCNC: 8.1 G/DL — LOW (ref 13–17)
MCHC RBC-ENTMCNC: 31.6 PG — SIGNIFICANT CHANGE UP (ref 27–34)
MCHC RBC-ENTMCNC: 32.4 GM/DL — SIGNIFICANT CHANGE UP (ref 32–36)
MCV RBC AUTO: 97.7 FL — SIGNIFICANT CHANGE UP (ref 80–100)
NRBC # BLD: 0 /100 WBCS — SIGNIFICANT CHANGE UP (ref 0–0)
PLATELET # BLD AUTO: 164 K/UL — SIGNIFICANT CHANGE UP (ref 150–400)
POTASSIUM SERPL-MCNC: 3.8 MMOL/L — SIGNIFICANT CHANGE UP (ref 3.5–5.3)
POTASSIUM SERPL-SCNC: 3.8 MMOL/L — SIGNIFICANT CHANGE UP (ref 3.5–5.3)
RBC # BLD: 2.56 M/UL — LOW (ref 4.2–5.8)
RBC # FLD: 16.9 % — HIGH (ref 10.3–14.5)
SARS-COV-2 RNA SPEC QL NAA+PROBE: SIGNIFICANT CHANGE UP
SODIUM SERPL-SCNC: 140 MMOL/L — SIGNIFICANT CHANGE UP (ref 135–145)
WBC # BLD: 5.03 K/UL — SIGNIFICANT CHANGE UP (ref 3.8–10.5)
WBC # FLD AUTO: 5.03 K/UL — SIGNIFICANT CHANGE UP (ref 3.8–10.5)

## 2024-03-26 PROCEDURE — 93306 TTE W/DOPPLER COMPLETE: CPT | Mod: 26

## 2024-03-26 RX ORDER — METOPROLOL TARTRATE 50 MG
25 TABLET ORAL DAILY
Refills: 0 | Status: DISCONTINUED | OUTPATIENT
Start: 2024-03-26 | End: 2024-03-27

## 2024-03-26 RX ORDER — IRON SUCROSE 20 MG/ML
200 INJECTION, SOLUTION INTRAVENOUS ONCE
Refills: 0 | Status: COMPLETED | OUTPATIENT
Start: 2024-03-26 | End: 2024-03-26

## 2024-03-26 RX ADMIN — Medication 650 MILLIGRAM(S): at 11:57

## 2024-03-26 RX ADMIN — Medication 650 MILLIGRAM(S): at 13:27

## 2024-03-26 RX ADMIN — Medication 1 DROP(S): at 18:25

## 2024-03-26 RX ADMIN — TAMSULOSIN HYDROCHLORIDE 0.4 MILLIGRAM(S): 0.4 CAPSULE ORAL at 21:23

## 2024-03-26 RX ADMIN — Medication 145 MILLIGRAM(S): at 11:50

## 2024-03-26 RX ADMIN — IRON SUCROSE 110 MILLIGRAM(S): 20 INJECTION, SOLUTION INTRAVENOUS at 18:25

## 2024-03-26 RX ADMIN — Medication 1 DROP(S): at 00:12

## 2024-03-26 RX ADMIN — ATORVASTATIN CALCIUM 10 MILLIGRAM(S): 80 TABLET, FILM COATED ORAL at 21:23

## 2024-03-26 RX ADMIN — Medication 1 DROP(S): at 11:51

## 2024-03-26 RX ADMIN — Medication 25 MILLIGRAM(S): at 18:25

## 2024-03-26 RX ADMIN — Medication 1 DROP(S): at 05:52

## 2024-03-26 RX ADMIN — PANTOPRAZOLE SODIUM 40 MILLIGRAM(S): 20 TABLET, DELAYED RELEASE ORAL at 05:47

## 2024-03-26 NOTE — PHYSICAL THERAPY INITIAL EVALUATION ADULT - ADDITIONAL COMMENTS
Pt lives w/ wife in a pvt house w/ 3 steps to enter and a flt of stairs within. Pt stays on main floor.  PTA pt indep w/ st cane for ADLs. Pt's dtr reports pt has fallen x 3 in past several months.

## 2024-03-26 NOTE — DISCHARGE NOTE PROVIDER - NSDCMRMEDTOKEN_GEN_ALL_CORE_FT
Aggrenox 25 mg-200 mg oral capsule, extended release: 1 cap(s) orally 2 times a day  atorvastatin 10 mg oral tablet: 1 tab(s) orally once a day (at bedtime)  fenofibrate 160 mg oral tablet: 1 tab(s) orally once a day  Flomax 0.4 mg oral capsule: 1 cap(s) orally once a day  ketorolac 0.5% ophthalmic solution: 1 drop(s) in the left conjunctival sac 4 times a day  loperamide 2 mg oral capsule: 1 cap(s) orally once a day as needed for  diarrhea  metoprolol succinate 25 mg oral tablet, extended release: 1 tab(s) orally once a day  Multiple Vitamins oral tablet: 1 tab(s) orally once a day  omeprazole 20 mg oral delayed release capsule: 1 cap(s) orally once a day   atorvastatin 10 mg oral tablet: 1 tab(s) orally once a day (at bedtime)  fenofibrate 160 mg oral tablet: 1 tab(s) orally once a day  ketorolac 0.5% ophthalmic solution: 1 drop(s) in the left conjunctival sac 4 times a day  metoprolol succinate 25 mg oral tablet, extended release: 1 tab(s) orally once a day  omeprazole 20 mg oral delayed release capsule: 1 cap(s) orally once a day  tamsulosin 0.4 mg oral capsule: 1 cap(s) orally once a day (at bedtime)

## 2024-03-26 NOTE — DISCHARGE NOTE PROVIDER - NSDCFUADDAPPT_GEN_ALL_CORE_FT
APPTS ARE READY TO BE MADE: [ ] YES    Best Family or Patient Contact (if needed):    Additional Information about above appointments (if needed):    1: Follow-up with PCP.  2: Follow-up with cardiologist.  3: Follow-up with GI.     Other comments or requests:    APPTS ARE READY TO BE MADE: [X] YES    Best Family or Patient Contact (if needed):    Additional Information about above appointments (if needed):    1: Follow-up with PCP.  2: Follow-up with cardiologist.  3: Follow-up with GI.     Other comments or requests:    APPTS ARE READY TO BE MADE: [X] YES    Best Family or Patient Contact (if needed):    Additional Information about above appointments (if needed):    1: Follow-up with PCP.  2: Follow-up with cardiologist.  3: Follow-up with GI.     Other comments or requests:     Patient is being discharged to [facility]. Caregiver will arrange follow up.

## 2024-03-26 NOTE — DISCHARGE NOTE PROVIDER - HOSPITAL COURSE
HPI:89-year-old male history of CAD status post quadruple bypass on Aggrenox, HTN, HLD, AVM presents with rectal bleeding.  Patient reports 1 day of blood in stools initially had 1 episode of bright red blood streaking stools since then has had several episodes of loose dark foul-smelling stools.  Last took Aggrenox night before admission to hospital.  Patient wife reports he has been lethargic the two days prior to admission via emergency department.  Last colonoscopy/endoscopy over 10 years ago when as per patient he has an experimental "stent" placed in the colon, however upon clarification it was a dural stent placed for non-GI related issue.     Hospital Course: Patient presents with melena, fecal occult positive. Gastroenterology following, status post IV protonix drip. on CT A/P: active GI hemorrhage in ascending colon, status post 1 unit of PRBC and 1 unit of platelets on 3/23/2024. Patient had colonoscopy done 3/25/2024 with ulcer in ascending colon suspicious for prior bleed, GI and IR signed off 3/26/2024.   TTE results ****    Important Medication Changes and Reason:  ****    Active or Pending Issues Requiring Follow-up:  Follow-up with PCP.  Follow-up with cardiologist.  Follow-up with gastroenterologist.     Advanced Directives:   [X] Full code  [ ] DNR  [ ] Hospice    Discharge Diagnoses:  Melena       HPI:89-year-old male history of CAD status post quadruple bypass on Aggrenox, HTN, HLD, AVM presents with rectal bleeding.  Patient reports 1 day of blood in stools initially had 1 episode of bright red blood streaking stools since then has had several episodes of loose dark foul-smelling stools.  Last took Aggrenox night before admission to hospital.  Patient wife reports he has been lethargic the two days prior to admission via emergency department.  Last colonoscopy/endoscopy over 10 years ago when as per patient he has an experimental "stent" placed in the colon, however upon clarification it was a dural stent placed for non-GI related issue.     Hospital Course: Patient presents with melena, fecal occult positive. Gastroenterology following, status post IV protonix drip. on CT A/P: active GI hemorrhage in ascending colon, status post 1 unit of PRBC and 1 unit of platelets on 3/23/2024. Patient had colonoscopy done 3/25/2024 with ulcer in ascending colon suspicious for prior bleed, GI and IR signed off 3/26/2024.   TTE results: EF 49%  severely dilated LA   no AS/AI   no MS/trace MR and hypokinetic infero septum and basal inferolateral cabrera. Per Dr. Read from cardiology- present EF is unchanged from the most recent nuclear study. Patient is cleared by cardiology discharge.       Important Medication Changes and Reason:  Hold Aggrenox for one week. Resume Aggrenox in one week (4/3/2024). Follow-up closely with PCP and cardiologist.   Patient needs to get twice weekly CBC blood draws.     Active or Pending Issues Requiring Follow-up:  Follow-up with PCP.  Follow-up with cardiologist.  Follow-up with gastroenterologist.     Advanced Directives:   [X] Full code  [ ] DNR  [ ] Hospice    Discharge Diagnoses:  Melena      Request from Dr. Joseph to facilitate patient discharge. Medication reconciliation reviewed, revised and resolved with Dr. Joseph, who has medically cleared patient for discharge with follow-up as advised.

## 2024-03-26 NOTE — CHART NOTE - NSCHARTNOTEFT_GEN_A_CORE
IR Follow-Up     - Patient is hemodynamically stable  - H&H stable  - IR to sign off at this time  - Please reconsult as necessary    --  Jono Schmitt NP  Interventional Radiology  Available on [employer] TEAMS / [employer]    For EMERGENT inquiries/questions:  IR Pager ([facility]): [phone]    For non-emergent consults/questions:   Please place a sunrise order "Consult- Interventional Radiology" with an appropriate callback number    For questions about scheduling during appropriate work hours, call IR :  [facility]: [phone]    For outpatient IR booking:  [facility]: [phone]

## 2024-03-26 NOTE — DISCHARGE NOTE PROVIDER - CARE PROVIDER_API CALL
Emile Arreola  Internal Medicine  [address], [address]  [place] [address]  Phone: [phone]  Fax: [phone]  Follow Up Time: 1 week

## 2024-03-26 NOTE — DISCHARGE NOTE PROVIDER - NSDCCPCAREPLAN_GEN_ALL_CORE_FT
PRINCIPAL DISCHARGE DIAGNOSIS  Diagnosis: Acute upper GI bleeding  Assessment and Plan of Treatment: Patient presents with melena, fecal occult positive. Gastroenterology following, status post IV protonix drip. on CT A/P: active GI hemorrhage in ascending colon, status post 1 unit of PRBC and 1 unit of platelets on 3/23/2024. Patient had colonoscopy done 3/25/2024 with ulcer in ascending colon suspicious for prior bleed, GI and IR signed off 3/26/2024.     PRINCIPAL DISCHARGE DIAGNOSIS  Diagnosis: Acute upper GI bleeding  Assessment and Plan of Treatment: Patient presents with melena, fecal occult positive. Gastroenterology following, status post IV protonix drip. on CT A/P: active GI hemorrhage in ascending colon, status post 1 unit of PRBC and 1 unit of platelets on 3/23/2024. Patient had colonoscopy done 3/25/2024 with ulcer in ascending colon suspicious for prior bleed, GI and IR signed off 3/26/2024.  Hold Aggrenox for one week. Resume Aggrenox in one week (4/3/2024). Follow-up closely with PCP and cardiologist.   Patient needs to get twice weekly CBC blood draws.

## 2024-03-26 NOTE — CHART NOTE - NSCHARTNOTESELECT_GEN_ALL_CORE
Event Note
Gastroenterology/Event Note
Hb drop/Event Note
Interventional Radiology
Event Note
Follow up/Event Note

## 2024-03-26 NOTE — DISCHARGE NOTE PROVIDER - CONDITIONS AT DISCHARGE
Request from Dr. Joseph to facilitate patient discharge. Medication reconciliation reviewed, revised and resolved with Dr. Joseph, who has medically cleared patient for discharge with follow-up as advised.

## 2024-03-26 NOTE — DISCHARGE NOTE PROVIDER - YES NO FOR MLM POSITIVE OR NEGATIVE COVID RESULT
Patient and grandmother provided printed discharge instructions which included signs and symptoms to look out for, why to return to ER, and other follow up appointment to make. Patient and grandmother stated they understand discharge instructions and had no further questions or concerns at this time. Patient discharged to home with grandmother. Patient ambulated out of ER with stable gait.   ,

## 2024-03-26 NOTE — DISCHARGE NOTE PROVIDER - NSDCCAREPROVSEEN_GEN_ALL_CORE_FT
Kevin, Medardo Lopez, Arpan Dejesus, Chasity Storey, John Ballard, Yony Orosco, Lincoln Meredith, Emile Lopes, Byron Joseph, Bob REYES

## 2024-03-26 NOTE — PHYSICAL THERAPY INITIAL EVALUATION ADULT - RANGE OF MOTION EXAMINATION, REHAB EVAL
R shoulder limited 2/2 pain/bilateral upper extremity ROM was WFL (within functional limits)/bilateral lower extremity ROM was WFL (within functional limits)

## 2024-03-27 ENCOUNTER — TRANSCRIPTION ENCOUNTER (OUTPATIENT)
Age: 89
End: 2024-03-27

## 2024-03-27 VITALS
SYSTOLIC BLOOD PRESSURE: 168 MMHG | HEART RATE: 70 BPM | OXYGEN SATURATION: 95 % | TEMPERATURE: 99 F | RESPIRATION RATE: 18 BRPM | DIASTOLIC BLOOD PRESSURE: 81 MMHG

## 2024-03-27 LAB
BLD GP AB SCN SERPL QL: POSITIVE — SIGNIFICANT CHANGE UP
HCT VFR BLD CALC: 25.6 % — LOW (ref 39–50)
HGB BLD-MCNC: 8.5 G/DL — LOW (ref 13–17)
MCHC RBC-ENTMCNC: 31.7 PG — SIGNIFICANT CHANGE UP (ref 27–34)
MCHC RBC-ENTMCNC: 33.2 GM/DL — SIGNIFICANT CHANGE UP (ref 32–36)
MCV RBC AUTO: 95.5 FL — SIGNIFICANT CHANGE UP (ref 80–100)
NRBC # BLD: 0 /100 WBCS — SIGNIFICANT CHANGE UP (ref 0–0)
PLATELET # BLD AUTO: 167 K/UL — SIGNIFICANT CHANGE UP (ref 150–400)
RBC # BLD: 2.68 M/UL — LOW (ref 4.2–5.8)
RBC # FLD: 16.3 % — HIGH (ref 10.3–14.5)
RH IG SCN BLD-IMP: POSITIVE — SIGNIFICANT CHANGE UP
WBC # BLD: 5.73 K/UL — SIGNIFICANT CHANGE UP (ref 3.8–10.5)
WBC # FLD AUTO: 5.73 K/UL — SIGNIFICANT CHANGE UP (ref 3.8–10.5)

## 2024-03-27 PROCEDURE — 85730 THROMBOPLASTIN TIME PARTIAL: CPT

## 2024-03-27 PROCEDURE — 80048 BASIC METABOLIC PNL TOTAL CA: CPT

## 2024-03-27 PROCEDURE — 36415 COLL VENOUS BLD VENIPUNCTURE: CPT

## 2024-03-27 PROCEDURE — 74177 CT ABD & PELVIS W/CONTRAST: CPT | Mod: MC

## 2024-03-27 PROCEDURE — 84100 ASSAY OF PHOSPHORUS: CPT

## 2024-03-27 PROCEDURE — 86900 BLOOD TYPING SEROLOGIC ABO: CPT

## 2024-03-27 PROCEDURE — 83880 ASSAY OF NATRIURETIC PEPTIDE: CPT

## 2024-03-27 PROCEDURE — P9037: CPT

## 2024-03-27 PROCEDURE — 80053 COMPREHEN METABOLIC PANEL: CPT

## 2024-03-27 PROCEDURE — 83690 ASSAY OF LIPASE: CPT

## 2024-03-27 PROCEDURE — 82330 ASSAY OF CALCIUM: CPT

## 2024-03-27 PROCEDURE — 82607 VITAMIN B-12: CPT

## 2024-03-27 PROCEDURE — 83540 ASSAY OF IRON: CPT

## 2024-03-27 PROCEDURE — 85396 CLOTTING ASSAY WHOLE BLOOD: CPT

## 2024-03-27 PROCEDURE — 88305 TISSUE EXAM BY PATHOLOGIST: CPT

## 2024-03-27 PROCEDURE — 85014 HEMATOCRIT: CPT

## 2024-03-27 PROCEDURE — 86850 RBC ANTIBODY SCREEN: CPT

## 2024-03-27 PROCEDURE — 83036 HEMOGLOBIN GLYCOSYLATED A1C: CPT

## 2024-03-27 PROCEDURE — 86901 BLOOD TYPING SEROLOGIC RH(D): CPT

## 2024-03-27 PROCEDURE — 84295 ASSAY OF SERUM SODIUM: CPT

## 2024-03-27 PROCEDURE — P9040: CPT

## 2024-03-27 PROCEDURE — 83605 ASSAY OF LACTIC ACID: CPT

## 2024-03-27 PROCEDURE — 93005 ELECTROCARDIOGRAM TRACING: CPT

## 2024-03-27 PROCEDURE — 86880 COOMBS TEST DIRECT: CPT

## 2024-03-27 PROCEDURE — 82728 ASSAY OF FERRITIN: CPT

## 2024-03-27 PROCEDURE — 86905 BLOOD TYPING RBC ANTIGENS: CPT

## 2024-03-27 PROCEDURE — 86902 BLOOD TYPE ANTIGEN DONOR EA: CPT

## 2024-03-27 PROCEDURE — 99285 EMERGENCY DEPT VISIT HI MDM: CPT | Mod: 25

## 2024-03-27 PROCEDURE — 86922 COMPATIBILITY TEST ANTIGLOB: CPT

## 2024-03-27 PROCEDURE — 87635 SARS-COV-2 COVID-19 AMP PRB: CPT

## 2024-03-27 PROCEDURE — 85027 COMPLETE CBC AUTOMATED: CPT

## 2024-03-27 PROCEDURE — 96374 THER/PROPH/DIAG INJ IV PUSH: CPT

## 2024-03-27 PROCEDURE — 82947 ASSAY GLUCOSE BLOOD QUANT: CPT

## 2024-03-27 PROCEDURE — 85018 HEMOGLOBIN: CPT

## 2024-03-27 PROCEDURE — 97161 PT EVAL LOW COMPLEX 20 MIN: CPT

## 2024-03-27 PROCEDURE — P9100: CPT

## 2024-03-27 PROCEDURE — 83550 IRON BINDING TEST: CPT

## 2024-03-27 PROCEDURE — 84132 ASSAY OF SERUM POTASSIUM: CPT

## 2024-03-27 PROCEDURE — 82272 OCCULT BLD FECES 1-3 TESTS: CPT

## 2024-03-27 PROCEDURE — 84443 ASSAY THYROID STIM HORMONE: CPT

## 2024-03-27 PROCEDURE — 85610 PROTHROMBIN TIME: CPT

## 2024-03-27 PROCEDURE — 86870 RBC ANTIBODY IDENTIFICATION: CPT

## 2024-03-27 PROCEDURE — 82803 BLOOD GASES ANY COMBINATION: CPT

## 2024-03-27 PROCEDURE — 71045 X-RAY EXAM CHEST 1 VIEW: CPT

## 2024-03-27 PROCEDURE — 82435 ASSAY OF BLOOD CHLORIDE: CPT

## 2024-03-27 PROCEDURE — 85025 COMPLETE CBC W/AUTO DIFF WBC: CPT

## 2024-03-27 PROCEDURE — 83735 ASSAY OF MAGNESIUM: CPT

## 2024-03-27 PROCEDURE — 93306 TTE W/DOPPLER COMPLETE: CPT

## 2024-03-27 PROCEDURE — 36430 TRANSFUSION BLD/BLD COMPNT: CPT

## 2024-03-27 PROCEDURE — 82746 ASSAY OF FOLIC ACID SERUM: CPT

## 2024-03-27 PROCEDURE — 81003 URINALYSIS AUTO W/O SCOPE: CPT

## 2024-03-27 RX ORDER — ATORVASTATIN CALCIUM 80 MG/1
1 TABLET, FILM COATED ORAL
Qty: 0 | Refills: 0 | DISCHARGE
Start: 2024-03-27

## 2024-03-27 RX ORDER — ASPIRIN AND DIPYRIDAMOLE 25; 200 MG/1; MG/1
1 CAPSULE, EXTENDED RELEASE ORAL
Refills: 0 | DISCHARGE

## 2024-03-27 RX ORDER — METOPROLOL TARTRATE 50 MG
1 TABLET ORAL
Qty: 0 | Refills: 0 | DISCHARGE
Start: 2024-03-27

## 2024-03-27 RX ORDER — LOPERAMIDE HCL 2 MG
1 TABLET ORAL
Refills: 0 | DISCHARGE

## 2024-03-27 RX ORDER — METOPROLOL TARTRATE 50 MG
1 TABLET ORAL
Refills: 0 | DISCHARGE

## 2024-03-27 RX ORDER — TAMSULOSIN HYDROCHLORIDE 0.4 MG/1
1 CAPSULE ORAL
Qty: 0 | Refills: 0 | DISCHARGE
Start: 2024-03-27

## 2024-03-27 RX ADMIN — Medication 25 MILLIGRAM(S): at 06:38

## 2024-03-27 RX ADMIN — Medication 1 DROP(S): at 00:41

## 2024-03-27 RX ADMIN — Medication 145 MILLIGRAM(S): at 11:28

## 2024-03-27 RX ADMIN — Medication 1 DROP(S): at 06:38

## 2024-03-27 RX ADMIN — PANTOPRAZOLE SODIUM 40 MILLIGRAM(S): 20 TABLET, DELAYED RELEASE ORAL at 06:38

## 2024-03-27 RX ADMIN — Medication 1 DROP(S): at 11:59

## 2024-03-27 RX ADMIN — Medication 650 MILLIGRAM(S): at 11:28

## 2024-03-27 NOTE — DISCHARGE NOTE NURSING/CASE MANAGEMENT/SOCIAL WORK - NSDCPEFALRISK_GEN_ALL_CORE
For information on Fall & Injury Prevention, visit: [email or web address] OR  [email or web address] OR  https://www.cdc.gov/steadi/patient.html

## 2024-03-27 NOTE — PROGRESS NOTE ADULT - PROBLEM SELECTOR PLAN 2
asymptomatic  continue statin  patient is euvolemic with no evidence of fluid overload

## 2024-03-27 NOTE — PROGRESS NOTE ADULT - PROBLEM SELECTOR PROBLEM 1
Acute lower GI hemorrhage

## 2024-03-27 NOTE — PROGRESS NOTE ADULT - PROVIDER SPECIALTY LIST ADULT
Cardiology
Gastroenterology
Cardiology
Internal Medicine

## 2024-03-27 NOTE — PROGRESS NOTE ADULT - ASSESSMENT
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  Had dyspnea during second unit prbc, echo done today results pending.  Discussed in detail with patient and daughter; need to discuss with gi as to when/if aggrenox can be restarted.  
89-year-old male history of CAD status post quadruple bypass on Aggrenox, HTN, HLD, AVM presents with rectal bleeding CTA positive for active colonic bleed likely diverticular in etiology, hemodynamically stable 
89-year-old male history of CAD status post quadruple bypass on Aggrenox, HTN, HLD, AVM presents with rectal bleeding.  Patient reports 1 day of blood in stools initially had 1 episode of bright red blood streaking stools since then has had several episodes of loose dark foul-smelling stools.      # Hematochezia with +CTA in ascending colon, likely due to bleed from ascending colon ulcer noted on colonoscopy 3/25; the ulcer was clean based s/p bx.  Patient is a vasculopath as such, ulceration may be result of colonic ischemia. However, patient hgb stable without additional bleeding and no active bleeding during colonoscopy. No indication for abx at this time. Further evaluation of vascular can be done at the discretion of the primary team and if within patient's GOC.    Recommendations   - No additional intervention from GI indicated at this time  - Follow up biopsies  - Ensure adequate hydration  - Diet as tolerated  - Rest of care per primary    Preliminary note until signed by Attending.    Thank you for involving us in this patient's care. Please reach out if any further questions or concerns. Signing off.      Yen Dejesus MD  Gastroenterology/Hepatology Fellow, PGY-7    NON-URGENT CONSULTS:  Please email [email or web address]  [email or web address]  AT NIGHT AND ON WEEKENDS:  Contact on-call GI fellow via answering service ([phone]) from 5pm-8am and on weekends/holidays  MONDAY-FRIDAY 8AM-5PM:  Pager: [phone] ([facility])  Pager: [phone] (JIGNESH)  
89-year-old male history of CAD status post quadruple bypass on Aggrenox, HTN, HLD, AVM presents with rectal bleeding CTA positive for active colonic bleed likely diverticular in etiology, hemodynamically stable 

## 2024-03-27 NOTE — PROGRESS NOTE ADULT - PROBLEM SELECTOR PLAN 3
continue Flomax  asymptomatic

## 2024-03-27 NOTE — DISCHARGE NOTE NURSING/CASE MANAGEMENT/SOCIAL WORK - PATIENT PORTAL LINK FT
You can access the FollowMyHealth Patient Portal offered by [facility] by registering at the following website: [email or web address]. By joining [employer]’s FollowMyHealth portal, you will also be able to view your health information using other applications (apps) compatible with our system.

## 2024-03-27 NOTE — PROGRESS NOTE ADULT - PROBLEM SELECTOR PLAN 1
remains hemodynamically stable  hemoglobin stable   s/p uneventful colonoscopy   will restart Aggrenox 1 week from now  continue to monitor closely in [facility]  twice weekly CBC  s/p Venofer x 1 dose
appear sto have ceased or at least slowed down  continue to monitor closely  remains hemodynamically stable  overnight some fleeting shortness of breath and chest pain during 2nd PRBC 200 ml in resolved immediately after stopping the PRBC  continue to monitor CBC if < 8 will transfuse again likely in 1/2 units  echocardiogram 1 year prior per outpatient cardiology Dr Sanon normal EF hyperdynamic LV mild MR  patient is optimized for colonoscopy tomorrow   cardiology will see early tomorrow and clear as well  discussed with cardiology attending
remains hemodynamically stable  continue to monitor CBC if < 8 will transfuse again likely in 1/2 units  echocardiogram Nov 2022 per outpatient cardiology Dr aSnon normal EF hyperdynamic LV mild MR  patient is optimized for colonoscopy   he has absolutely no symptoms, he is hemodynamically stable and has had an excellent exercise tolerance  cleared from cardiac and medical perspective for procedure today  discussed with cardiology attending
remains hemodynamically stable  hemoglobin stable   s/p uneventful colonoscopy   no active bleeding  + diverticulosis   tolerating diet  likely discharge tomorrow if no further bleeding  discussed with outpatient neuro Dr Leiva  ok to hold Aggrenox for a few more days  I am apprehensive it is tool soon to start any APT this close to recent significant bleeding

## 2024-03-27 NOTE — PROGRESS NOTE ADULT - PROBLEM SELECTOR PLAN 4
BP trending up  restart metoprolol with hold parameters
BP trending up  resume home meds
hold all BP meds  BP acceptable off meds
hold all BP meds  BP acceptable off meds

## 2024-03-27 NOTE — PROGRESS NOTE ADULT - SUBJECTIVE AND OBJECTIVE BOX
SUBJECTIVE: Asymptomatic. Wife and daughter at bedside.   	  MEDICATIONS:  acetaminophen     Tablet .. 650 milliGRAM(s) Oral every 6 hours PRN  pantoprazole  Injectable 40 milliGRAM(s) IV Push every 24 hours  atorvastatin 10 milliGRAM(s) Oral at bedtime  fenofibrate Tablet 145 milliGRAM(s) Oral daily  ketorolac 0.5% Ophthalmic Solution 1 Drop(s) Both EYES four times a day  tamsulosin 0.4 milliGRAM(s) Oral at bedtime      REVIEW OF SYSTEMS:    CONSTITUTIONAL: No fever, weight loss, or fatigue  EYES: No eye pain, visual disturbances, or discharge  NECK: No pain or stiffness  RESPIRATORY: No cough, wheezing, chills or hemoptysis; No Shortness of Breath  CARDIOVASCULAR: No chest pain, palpitations, dizziness, or leg swelling  GASTROINTESTINAL: No abdominal or epigastric pain. No nausea, vomiting, or hematemesis; No diarrhea or constipation. No melena or hematochezia.  GENITOURINARY: No dysuria, frequency, hematuria, or incontinence  NEUROLOGICAL: No headaches, memory loss, loss of strength, numbness, or tremors  SKIN: No itching, burning, rashes, or lesions   LYMPH Nodes: No enlarged glands  MUSCULOSKELETAL: No joint pain or swelling; No muscle, back, or extremity pain  All other review of systems are negative.  	      PHYSICAL EXAM:  T(C): 37 (03-26-24 @ 04:30), Max: 37.1 (03-25-24 @ 15:35)  HR: 65 (03-26-24 @ 04:30) (62 - 79)  BP: 129/64 (03-26-24 @ 04:30) (99/48 - 195/81)  RR: 18 (03-26-24 @ 04:30) (12 - 20)  SpO2: 92% (03-26-24 @ 04:30) (92% - 100%)  Wt(kg): --  I&O's Summary    25 Mar 2024 07:01  -  26 Mar 2024 07:00  --------------------------------------------------------  IN: 0 mL / OUT: 500 mL / NET: -500 mL      Height (cm): 167.6 (03-25 @ 15:31)  Weight (kg): 87.1 (03-25 @ 15:31)  BMI (kg/m2): 31 (03-25 @ 15:31)  BSA (m2): 1.97 (03-25 @ 15:31)    PHYSICAL EXAM    Appearance: Normal	  HEENT:   Normal oral mucosa, PERRL, EOMI	  NECK: Soft and supple, No LAD, No JVD  Cardiovascular: Regular Rate and Rhythm, Normal S1 S2, No murmurs, No clicks, gallops or rubs  Respiratory: Lungs clear to auscultation	  Extremities: No clubbing, cyanosis or edema      TELEMETRY: 	sr 76 bpm    	    LABS:	 	                 8.1    5.03  )-----------( 164      ( 26 Mar 2024 07:24 )             25.0     03-26    140  |  105  |  14  ----------------------------<  101<H>  3.8   |  21<L>  |  1.20    Ca    8.5      26 Mar 2024 07:24  Phos  3.3     03-25  Mg     1.6     03-25      Colonoscopy done yesterday:  Impression:          - A single (solitary) ulcer in the ascending colon. Suspicous for prior                        bleeding source. Suspect ischemic. Biopsied.                       - Diverticulosis in the sigmoid colon, in the descending colon, in the                        transverse colon, in the ascending colon and in the cecum.                       - The examined portion of the ileum was normal.                       - A few 2 to 4 mm polyps in the ascending, descending colon, and cecum,                        benign appearing. Not removed.
Patient is a 89y old  Male who presents with a chief complaint of rectal bleeding (23 Mar 2024 19:42)      DATE OF SERVICE: 03-24-24 @ 15:29    SUBJECTIVE / OVERNIGHT EVENTS: overnight events noted    ROS:  Resp: No cough no sputum production  CVS: No chest pain no palpitations no orthopnea  GI: no N/V/D  one small BM this AM stool + blood small amt      MEDICATIONS  (STANDING):  atorvastatin 10 milliGRAM(s) Oral at bedtime  fenofibrate Tablet 145 milliGRAM(s) Oral daily  ketorolac 0.5% Ophthalmic Solution 1 Drop(s) Both EYES four times a day  polyethylene glycol/electrolyte Solution. 4000 milliLiter(s) Oral once  tamsulosin 0.4 milliGRAM(s) Oral at bedtime    MEDICATIONS  (PRN):        CAPILLARY BLOOD GLUCOSE        I&O's Summary    23 Mar 2024 07:01  -  24 Mar 2024 07:00  --------------------------------------------------------  IN: 480 mL / OUT: 750 mL / NET: -270 mL        Vital Signs Last 24 Hrs  T(C): 36.8 (24 Mar 2024 12:12), Max: 37.1 (23 Mar 2024 19:57)  T(F): 98.2 (24 Mar 2024 12:12), Max: 98.7 (23 Mar 2024 19:57)  HR: 61 (24 Mar 2024 12:12) (61 - 80)  BP: 145/68 (24 Mar 2024 12:12) (108/62 - 163/72)  BP(mean): --  RR: 18 (24 Mar 2024 12:12) (16 - 18)  SpO2: 97% (24 Mar 2024 12:12) (94% - 97%)    PHYSICAL EXAM: lying flat in bed  EYES: EOMI, PERRLA  NECK: Supple, No JVD  CHEST/LUNG: clear    HEART: S1 S2; systolic murmur +   ABDOMEN: Soft, Nontender  EXTREMITIES:   no edema  NEUROLOGY: AO x 3 non-focal      LABS:                        8.1    4.52  )-----------( 170      ( 24 Mar 2024 13:20 )             25.6     03-24    142  |  108  |  21  ----------------------------<  103<H>  3.9   |  21<L>  |  1.15    Ca    8.7      24 Mar 2024 06:58    TPro  6.7  /  Alb  3.6  /  TBili  0.3  /  DBili  x   /  AST  22  /  ALT  13  /  AlkPhos  46  03-23    PT/INR - ( 23 Mar 2024 09:24 )   PT: 11.8 sec;   INR: 1.07 ratio         PTT - ( 23 Mar 2024 09:24 )  PTT:28.5 sec      Urinalysis Basic - ( 24 Mar 2024 06:58 )    Color: x / Appearance: x / SG: x / pH: x  Gluc: 103 mg/dL / Ketone: x  / Bili: x / Urobili: x   Blood: x / Protein: x / Nitrite: x   Leuk Esterase: x / RBC: x / WBC x   Sq Epi: x / Non Sq Epi: x / Bacteria: x          All consultant(s) notes reviewed and care discussed with other providers        Contact Number, Dr Joseph [phone]
Resting comfortably  No dyspnea on RA  /68  HR 69  Lungs clear  RR 2/6 systolic murmur  No edema    WBC 5.73  Hgb 8.5  Hct 25.6   Plt 167K    Echo - EF 49%  severely dilated LA   no AS/AI   no MS/trace MR            hypokinetic infero septum and basal inferolateral walls    Most recent nuclear stress  8/22 - EF 52% with fixed lateral defect    Imp:  S/P CABG with rectal bleeding  The Present EF is unchanged from the most recent nuclear study  with similar wall motion abnormalities  Patient is stable for transfer to Rehab from a CV stand point
Chief Complaint:  Patient is a 89y old  Male who presents with a chief complaint of rectal bleeding (25 Mar 2024 14:43)       Interval Events:   Afebrile and stable  No bleeding reported  Hgb stable      Hospital Medications:  acetaminophen     Tablet .. 650 milliGRAM(s) Oral every 6 hours PRN  atorvastatin 10 milliGRAM(s) Oral at bedtime  fenofibrate Tablet 145 milliGRAM(s) Oral daily  ketorolac 0.5% Ophthalmic Solution 1 Drop(s) Both EYES four times a day  pantoprazole  Injectable 40 milliGRAM(s) IV Push every 24 hours  tamsulosin 0.4 milliGRAM(s) Oral at bedtime      ROS:   Complete and normal except as mentioned above.    PHYSICAL EXAM:   Vital Signs:  Vital Signs Last 24 Hrs  T(C): 37 (26 Mar 2024 04:30), Max: 37.1 (25 Mar 2024 15:35)  T(F): 98.6 (26 Mar 2024 04:30), Max: 98.8 (25 Mar 2024 15:35)  HR: 65 (26 Mar 2024 04:30) (62 - 79)  BP: 129/64 (26 Mar 2024 04:30) (99/48 - 195/81)  BP(mean): --  RR: 18 (26 Mar 2024 04:30) (12 - 20)  SpO2: 92% (26 Mar 2024 04:30) (92% - 100%)    Parameters below as of 26 Mar 2024 04:30  Patient On (Oxygen Delivery Method): room air      Daily Height in cm: 167.64 (25 Mar 2024 15:31)    Daily     GENERAL: no acute distress  NEURO: aox3  HEENT: anicteric sclera, no conjunctival pallor appreciated  CHEST: no respiratory distress, no accessory muscle use  CARDIAC: regular rate   ABDOMEN: soft, non-tender, non-distended, no rebound or guarding  EXTREMITIES: warm, well perfused   SKIN: no lesions noted    LABS:                          8.1    5.03  )-----------( 164      ( 26 Mar 2024 07:24 )             25.0     03-26    140  |  105  |  14  ----------------------------<  101<H>  3.8   |  21<L>  |  1.20    Ca    8.5      26 Mar 2024 07:24  Phos  3.3     03-25  Mg     1.6     03-25          Interval Diagnostic Studies:   Colonoscopy 3/25  < from: Colonoscopy (03.25.24 @ 16:37) >  Findings:       The perianal and digital rectal examinations were normal.       A single (solitary) twenty mm ulcer was found in the ascending colon. No bleeding was        present. No stigmata of recent bleeding were seen. Biopsies were taken with a cold forceps        for histology.       Scattered small and large-mouthed diverticula were found in the sigmoid colon, descending        colon, transverse colon, ascending colon and cecum.       The terminal ileum appeared normal.       The exam was otherwise normal throughout the examined colon.       A few 2 to 4 mm polyps was found in the descending colon ascending colon cecum. The polyps        were sessile.           Impression:          - A single (solitary) ulcer in the ascending colon. Suspicous for prior                        bleeding source. Suspect ischemic. Biopsied.                       - Diverticulosis in the sigmoid colon, in the descending colon, in the                        transverse colon, in the ascending colon and in the cecum.                       - The examined portion of the ileum was normal.                       - A few 2 to 4 mm polyps in the ascending, descending colon, andcecum,                        benign appearing. Not removed.    < end of copied text >    
Patient is a 89y old  Male who presents with a chief complaint of rectal bleeding (25 Mar 2024 08:53)      DATE OF SERVICE: 03-25-24 @ 14:44    SUBJECTIVE / OVERNIGHT EVENTS: overnight events noted    ROS:  Resp: No cough no sputum production  CVS: No chest pain no palpitations no orthopnea  GI: no N/V/D      MEDICATIONS  (STANDING):  atorvastatin 10 milliGRAM(s) Oral at bedtime  fenofibrate Tablet 145 milliGRAM(s) Oral daily  ketorolac 0.5% Ophthalmic Solution 1 Drop(s) Both EYES four times a day  pantoprazole  Injectable 40 milliGRAM(s) IV Push every 24 hours  tamsulosin 0.4 milliGRAM(s) Oral at bedtime    MEDICATIONS  (PRN):        CAPILLARY BLOOD GLUCOSE        I&O's Summary    24 Mar 2024 07:01  -  25 Mar 2024 07:00  --------------------------------------------------------  IN: 500 mL / OUT: 1330 mL / NET: -830 mL    25 Mar 2024 07:01  -  25 Mar 2024 14:44  --------------------------------------------------------  IN: 0 mL / OUT: 300 mL / NET: -300 mL        Vital Signs Last 24 Hrs  T(C): 36.7 (25 Mar 2024 12:11), Max: 37 (25 Mar 2024 00:23)  T(F): 98.1 (25 Mar 2024 12:11), Max: 98.6 (25 Mar 2024 00:23)  HR: 62 (25 Mar 2024 12:11) (57 - 72)  BP: 150/73 (25 Mar 2024 12:11) (141/74 - 155/72)  BP(mean): --  RR: 18 (25 Mar 2024 12:11) (18 - 19)  SpO2: 95% (25 Mar 2024 12:11) (95% - 97%)    PHYSICAL EXAM: lying flat in bed  EYES: EOMI, PERRLA  NECK: Supple, No JVD  CHEST/LUNG: clear    HEART: S1 S2; systolic murmur +   ABDOMEN: Soft, Nontender  EXTREMITIES:   no edema  NEUROLOGY: AO x 3 non-focal    LABS:                        8.6    4.36  )-----------( 172      ( 25 Mar 2024 11:20 )             26.6     03-25    140  |  105  |  15  ----------------------------<  114<H>  3.8   |  23  |  1.11    Ca    8.5      25 Mar 2024 04:20  Phos  3.3     03-25  Mg     1.6     03-25      PT/INR - ( 25 Mar 2024 04:20 )   PT: 12.3 sec;   INR: 1.12 ratio         PTT - ( 25 Mar 2024 04:20 )  PTT:28.5 sec      Urinalysis Basic - ( 25 Mar 2024 04:20 )    Color: x / Appearance: x / SG: x / pH: x  Gluc: 114 mg/dL / Ketone: x  / Bili: x / Urobili: x   Blood: x / Protein: x / Nitrite: x   Leuk Esterase: x / RBC: x / WBC x   Sq Epi: x / Non Sq Epi: x / Bacteria: x          All consultant(s) notes reviewed and care discussed with other providers        Contact Number, Dr Joseph [phone]
Patient is a 89y old  Male who presents with a chief complaint of rectal bleeding (26 Mar 2024 11:43)      DATE OF SERVICE: 03-26-24 @ 15:24    SUBJECTIVE / OVERNIGHT EVENTS: overnight events noted    ROS:  Resp: No cough no sputum production  CVS: No chest pain no palpitations no orthopnea  GI: no N/V/D  'I feel fine'         MEDICATIONS  (STANDING):  atorvastatin 10 milliGRAM(s) Oral at bedtime  fenofibrate Tablet 145 milliGRAM(s) Oral daily  iron sucrose IVPB 200 milliGRAM(s) IV Intermittent once  ketorolac 0.5% Ophthalmic Solution 1 Drop(s) Both EYES four times a day  pantoprazole  Injectable 40 milliGRAM(s) IV Push every 24 hours  tamsulosin 0.4 milliGRAM(s) Oral at bedtime    MEDICATIONS  (PRN):  acetaminophen     Tablet .. 650 milliGRAM(s) Oral every 6 hours PRN Mild Pain (1 - 3), Moderate Pain (4 - 6)        CAPILLARY BLOOD GLUCOSE        I&O's Summary    25 Mar 2024 07:01  -  26 Mar 2024 07:00  --------------------------------------------------------  IN: 0 mL / OUT: 500 mL / NET: -500 mL    26 Mar 2024 07:01  -  26 Mar 2024 15:24  --------------------------------------------------------  IN: 0 mL / OUT: 500 mL / NET: -500 mL        Vital Signs Last 24 Hrs  T(C): 36.9 (26 Mar 2024 12:00), Max: 37.1 (25 Mar 2024 15:35)  T(F): 98.5 (26 Mar 2024 12:00), Max: 98.8 (25 Mar 2024 15:35)  HR: 71 (26 Mar 2024 12:00) (62 - 79)  BP: 157/63 (26 Mar 2024 12:00) (99/48 - 195/81)  BP(mean): --  RR: 18 (26 Mar 2024 12:00) (12 - 20)  SpO2: 96% (26 Mar 2024 12:00) (92% - 100%)    PHYSICAL EXAM:   NECK: Supple  CHEST/LUNG: clear    HEART: S1 S2; systolic murmur +   ABDOMEN: Soft, Nontender  EXTREMITIES:   no edema  NEUROLOGY: AO x 3 non-focal    LABS:                        8.1    5.03  )-----------( 164      ( 26 Mar 2024 07:24 )             25.0     03-26    140  |  105  |  14  ----------------------------<  101<H>  3.8   |  21<L>  |  1.20    Ca    8.5      26 Mar 2024 07:24  Phos  3.3     03-25  Mg     1.6     03-25      PT/INR - ( 25 Mar 2024 04:20 )   PT: 12.3 sec;   INR: 1.12 ratio         PTT - ( 25 Mar 2024 04:20 )  PTT:28.5 sec      Urinalysis Basic - ( 26 Mar 2024 07:24 )    Color: x / Appearance: x / SG: x / pH: x  Gluc: 101 mg/dL / Ketone: x  / Bili: x / Urobili: x   Blood: x / Protein: x / Nitrite: x   Leuk Esterase: x / RBC: x / WBC x   Sq Epi: x / Non Sq Epi: x / Bacteria: x          All consultant(s) notes reviewed and care discussed with other providers        Contact Number, Dr Joseph [phone]
Patient is a 89y old  Male who presents with a chief complaint of rectal bleeding (27 Mar 2024 12:13)      DATE OF SERVICE: 03-27-24 @ 13:05    SUBJECTIVE / OVERNIGHT EVENTS: overnight events noted    ROS:  Resp: No cough no sputum production  CVS: No chest pain no palpitations no orthopnea  GI: no N/V/D  : no dysuria, no hematuria  'I feel fine'         MEDICATIONS  (STANDING):  atorvastatin 10 milliGRAM(s) Oral at bedtime  fenofibrate Tablet 145 milliGRAM(s) Oral daily  ketorolac 0.5% Ophthalmic Solution 1 Drop(s) Both EYES four times a day  metoprolol succinate ER 25 milliGRAM(s) Oral daily  pantoprazole  Injectable 40 milliGRAM(s) IV Push every 24 hours  tamsulosin 0.4 milliGRAM(s) Oral at bedtime    MEDICATIONS  (PRN):  acetaminophen     Tablet .. 650 milliGRAM(s) Oral every 6 hours PRN Mild Pain (1 - 3), Moderate Pain (4 - 6)        CAPILLARY BLOOD GLUCOSE        I&O's Summary    26 Mar 2024 07:01  -  27 Mar 2024 07:00  --------------------------------------------------------  IN: 0 mL / OUT: 850 mL / NET: -850 mL    27 Mar 2024 07:01  -  27 Mar 2024 13:05  --------------------------------------------------------  IN: 0 mL / OUT: 220 mL / NET: -220 mL        Vital Signs Last 24 Hrs  T(C): 37.2 (27 Mar 2024 05:04), Max: 37.3 (26 Mar 2024 21:12)  T(F): 98.9 (27 Mar 2024 05:04), Max: 99.1 (26 Mar 2024 21:12)  HR: 69 (27 Mar 2024 05:04) (69 - 81)  BP: 126/68 (27 Mar 2024 05:04) (126/68 - 170/68)  BP(mean): --  RR: 18 (27 Mar 2024 05:04) (18 - 18)  SpO2: 94% (27 Mar 2024 05:04) (94% - 94%)    PHYSICAL EXAM:   NECK: Supple  CHEST/LUNG: clear    HEART: S1 S2; systolic murmur +   ABDOMEN: Soft, Nontender  EXTREMITIES:   no edema  NEUROLOGY: AO x 3 non-focal    LABS:                        8.5    5.73  )-----------( 167      ( 27 Mar 2024 07:06 )             25.6     03-26    140  |  105  |  14  ----------------------------<  101<H>  3.8   |  21<L>  |  1.20    Ca    8.5      26 Mar 2024 07:24            Urinalysis Basic - ( 26 Mar 2024 07:24 )    Color: x / Appearance: x / SG: x / pH: x  Gluc: 101 mg/dL / Ketone: x  / Bili: x / Urobili: x   Blood: x / Protein: x / Nitrite: x   Leuk Esterase: x / RBC: x / WBC x   Sq Epi: x / Non Sq Epi: x / Bacteria: x          All consultant(s) notes reviewed and care discussed with other providers        Contact Number, Dr Joseph [phone]

## 2024-03-27 NOTE — DISCHARGE NOTE NURSING/CASE MANAGEMENT/SOCIAL WORK - NSDCFUADDAPPT_GEN_ALL_CORE_FT
APPTS ARE READY TO BE MADE: [X] YES    Best Family or Patient Contact (if needed):    Additional Information about above appointments (if needed):    1: Follow-up with PCP.  2: Follow-up with cardiologist.  3: Follow-up with GI.     Other comments or requests:

## 2024-03-27 NOTE — PROGRESS NOTE ADULT - NSPROGADDITIONALINFOA_GEN_ALL_CORE
discussed with patient in detail, expresses understanding of treatment plans.  discussed with patient's family at bedside in detail (wife and daughter Eva)
discussed with patient in detail, expresses understanding of treatment plans.  discussed with patient's wife at bedside in detail   discussed with covering ACP
discussed with patient in detail, expresses understanding of treatment plans.  discussed with daughter at bedside in detail  discussed with patient's wife at bedside in detail  stable fo for discharge tomorrow likely Subacute Rehab
discussed with anesthesia attending

## 2024-03-30 LAB — SURGICAL PATHOLOGY STUDY: SIGNIFICANT CHANGE UP

## 2024-05-13 NOTE — DISCHARGE NOTE PROVIDER - NSDCHC_MEDRECSTATUS_GEN_ALL_CORE
Please call.  According to our records she should have run out of the Trulicity prescription in January.  I have not seen her since 10/2023  Please find out insurance preferred alternative to Comfort Touch insulin pen needle 32 G x 5 mm   Admission Reconciliation is Completed  Discharge Reconciliation is Not Complete Admission Reconciliation is Completed  Discharge Reconciliation is Completed

## 2024-09-05 NOTE — ED PROVIDER NOTE - WHICH SHOWED
. ECG recorded at 946 independently interpreted by me , Dr Jono Lewis,  at 958 shows normal sinus rhythm first-degree AV block  ms, occasional PACs right bundle branch block  ms left axis deviation T wave inversions lead aVL and lead I, no acute diagnostic ischemic ST-T changes

## 2025-02-01 ENCOUNTER — INPATIENT (INPATIENT)
Facility: HOSPITAL | Age: 89
LOS: 2 days | Discharge: HOME | DRG: 379 | End: 2025-02-04
Attending: INTERNAL MEDICINE | Admitting: INTERNAL MEDICINE
Payer: MEDICARE

## 2025-02-01 VITALS
DIASTOLIC BLOOD PRESSURE: 72 MMHG | HEART RATE: 75 BPM | RESPIRATION RATE: 20 BRPM | WEIGHT: 190.04 LBS | OXYGEN SATURATION: 100 % | TEMPERATURE: 98 F | SYSTOLIC BLOOD PRESSURE: 158 MMHG | HEIGHT: 66 IN

## 2025-02-01 DIAGNOSIS — Z98.89 OTHER SPECIFIED POSTPROCEDURAL STATES: Chronic | ICD-10-CM

## 2025-02-01 DIAGNOSIS — Z95.1 PRESENCE OF AORTOCORONARY BYPASS GRAFT: ICD-10-CM

## 2025-02-01 DIAGNOSIS — Z87.438 PERSONAL HISTORY OF OTHER DISEASES OF MALE GENITAL ORGANS: ICD-10-CM

## 2025-02-01 DIAGNOSIS — I10 ESSENTIAL (PRIMARY) HYPERTENSION: ICD-10-CM

## 2025-02-01 DIAGNOSIS — D62 ACUTE POSTHEMORRHAGIC ANEMIA: ICD-10-CM

## 2025-02-01 DIAGNOSIS — Q27.30 ARTERIOVENOUS MALFORMATION, SITE UNSPECIFIED: Chronic | ICD-10-CM

## 2025-02-01 DIAGNOSIS — E78.5 HYPERLIPIDEMIA, UNSPECIFIED: ICD-10-CM

## 2025-02-01 DIAGNOSIS — K92.2 GASTROINTESTINAL HEMORRHAGE, UNSPECIFIED: ICD-10-CM

## 2025-02-01 LAB
ADD ON TEST-SPECIMEN IN LAB: SIGNIFICANT CHANGE UP
ALBUMIN SERPL ELPH-MCNC: 3.7 G/DL — SIGNIFICANT CHANGE UP (ref 3.3–5)
ALP SERPL-CCNC: 44 U/L — SIGNIFICANT CHANGE UP (ref 40–120)
ALT FLD-CCNC: 9 U/L — LOW (ref 10–45)
ANION GAP SERPL CALC-SCNC: 14 MMOL/L — SIGNIFICANT CHANGE UP (ref 5–17)
APTT BLD: 28.8 SEC — SIGNIFICANT CHANGE UP (ref 24.5–35.6)
AST SERPL-CCNC: 22 U/L — SIGNIFICANT CHANGE UP (ref 10–40)
BASOPHILS # BLD AUTO: 0 K/UL — SIGNIFICANT CHANGE UP (ref 0–0.2)
BASOPHILS # BLD AUTO: 0.01 K/UL — SIGNIFICANT CHANGE UP (ref 0–0.2)
BASOPHILS NFR BLD AUTO: 0 % — SIGNIFICANT CHANGE UP (ref 0–2)
BASOPHILS NFR BLD AUTO: 0.1 % — SIGNIFICANT CHANGE UP (ref 0–2)
BILIRUB SERPL-MCNC: 0.5 MG/DL — SIGNIFICANT CHANGE UP (ref 0.2–1.2)
BLD GP AB SCN SERPL QL: POSITIVE — SIGNIFICANT CHANGE UP
BUN SERPL-MCNC: 32 MG/DL — HIGH (ref 7–23)
CALCIUM SERPL-MCNC: 8.7 MG/DL — SIGNIFICANT CHANGE UP (ref 8.4–10.5)
CHLORIDE SERPL-SCNC: 104 MMOL/L — SIGNIFICANT CHANGE UP (ref 96–108)
CO2 SERPL-SCNC: 23 MMOL/L — SIGNIFICANT CHANGE UP (ref 22–31)
CREAT SERPL-MCNC: 1.34 MG/DL — HIGH (ref 0.5–1.3)
EGFR: 50 ML/MIN/1.73M2 — LOW
EOSINOPHIL # BLD AUTO: 0 K/UL — SIGNIFICANT CHANGE UP (ref 0–0.5)
EOSINOPHIL # BLD AUTO: 0.04 K/UL — SIGNIFICANT CHANGE UP (ref 0–0.5)
EOSINOPHIL NFR BLD AUTO: 0 % — SIGNIFICANT CHANGE UP (ref 0–6)
EOSINOPHIL NFR BLD AUTO: 0.6 % — SIGNIFICANT CHANGE UP (ref 0–6)
GAS PNL BLDV: SIGNIFICANT CHANGE UP
GIANT PLATELETS BLD QL SMEAR: PRESENT — SIGNIFICANT CHANGE UP
GLUCOSE SERPL-MCNC: 141 MG/DL — HIGH (ref 70–99)
HCT VFR BLD CALC: 30.5 % — LOW (ref 39–50)
HCT VFR BLD CALC: 31 % — LOW (ref 39–50)
HCT VFR BLD CALC: 33.7 % — LOW (ref 39–50)
HGB BLD-MCNC: 10.6 G/DL — LOW (ref 13–17)
HGB BLD-MCNC: 9.9 G/DL — LOW (ref 13–17)
HGB BLD-MCNC: 9.9 G/DL — LOW (ref 13–17)
IMM GRANULOCYTES NFR BLD AUTO: 0.3 % — SIGNIFICANT CHANGE UP (ref 0–0.9)
INR BLD: 1.16 RATIO — SIGNIFICANT CHANGE UP (ref 0.85–1.16)
LYMPHOCYTES # BLD AUTO: 0.22 K/UL — LOW (ref 1–3.3)
LYMPHOCYTES # BLD AUTO: 0.59 K/UL — LOW (ref 1–3.3)
LYMPHOCYTES # BLD AUTO: 4.4 % — LOW (ref 13–44)
LYMPHOCYTES # BLD AUTO: 8.6 % — LOW (ref 13–44)
MANUAL SMEAR VERIFICATION: SIGNIFICANT CHANGE UP
MCHC RBC-ENTMCNC: 31.5 G/DL — LOW (ref 32–36)
MCHC RBC-ENTMCNC: 31.9 G/DL — LOW (ref 32–36)
MCHC RBC-ENTMCNC: 32.1 PG — SIGNIFICANT CHANGE UP (ref 27–34)
MCHC RBC-ENTMCNC: 32.4 PG — SIGNIFICANT CHANGE UP (ref 27–34)
MCHC RBC-ENTMCNC: 32.5 G/DL — SIGNIFICANT CHANGE UP (ref 32–36)
MCHC RBC-ENTMCNC: 32.9 PG — SIGNIFICANT CHANGE UP (ref 27–34)
MCV RBC AUTO: 100.6 FL — HIGH (ref 80–100)
MCV RBC AUTO: 101.3 FL — HIGH (ref 80–100)
MCV RBC AUTO: 103.1 FL — HIGH (ref 80–100)
MONOCYTES # BLD AUTO: 0.09 K/UL — SIGNIFICANT CHANGE UP (ref 0–0.9)
MONOCYTES # BLD AUTO: 0.21 K/UL — SIGNIFICANT CHANGE UP (ref 0–0.9)
MONOCYTES NFR BLD AUTO: 1.7 % — LOW (ref 2–14)
MONOCYTES NFR BLD AUTO: 3.1 % — SIGNIFICANT CHANGE UP (ref 2–14)
NEUTROPHILS # BLD AUTO: 4.78 K/UL — SIGNIFICANT CHANGE UP (ref 1.8–7.4)
NEUTROPHILS # BLD AUTO: 5.98 K/UL — SIGNIFICANT CHANGE UP (ref 1.8–7.4)
NEUTROPHILS NFR BLD AUTO: 86.1 % — HIGH (ref 43–77)
NEUTROPHILS NFR BLD AUTO: 87.3 % — HIGH (ref 43–77)
NEUTS BAND # BLD: 7.8 % — SIGNIFICANT CHANGE UP (ref 0–8)
NEUTS BAND NFR BLD: 7.8 % — SIGNIFICANT CHANGE UP (ref 0–8)
NRBC # BLD: 0 /100 WBCS — SIGNIFICANT CHANGE UP (ref 0–0)
NRBC # BLD: 0 /100 WBCS — SIGNIFICANT CHANGE UP (ref 0–0)
NRBC BLD-RTO: 0 /100 WBCS — SIGNIFICANT CHANGE UP (ref 0–0)
NRBC BLD-RTO: 0 /100 WBCS — SIGNIFICANT CHANGE UP (ref 0–0)
OB PNL STL: POSITIVE
PLAT MORPH BLD: NORMAL — SIGNIFICANT CHANGE UP
PLATELET # BLD AUTO: 172 K/UL — SIGNIFICANT CHANGE UP (ref 150–400)
PLATELET # BLD AUTO: 182 K/UL — SIGNIFICANT CHANGE UP (ref 150–400)
PLATELET # BLD AUTO: 210 K/UL — SIGNIFICANT CHANGE UP (ref 150–400)
POTASSIUM SERPL-MCNC: 3.8 MMOL/L — SIGNIFICANT CHANGE UP (ref 3.5–5.3)
POTASSIUM SERPL-SCNC: 3.8 MMOL/L — SIGNIFICANT CHANGE UP (ref 3.5–5.3)
PROT SERPL-MCNC: 7.1 G/DL — SIGNIFICANT CHANGE UP (ref 6–8.3)
PROTHROM AB SERPL-ACNC: 13.3 SEC — SIGNIFICANT CHANGE UP (ref 9.9–13.4)
RBC # BLD: 3.01 M/UL — LOW (ref 4.2–5.8)
RBC # BLD: 3.08 M/UL — LOW (ref 4.2–5.8)
RBC # BLD: 3.27 M/UL — LOW (ref 4.2–5.8)
RBC # FLD: 15.1 % — HIGH (ref 10.3–14.5)
RBC # FLD: 15.2 % — HIGH (ref 10.3–14.5)
RBC # FLD: 15.2 % — HIGH (ref 10.3–14.5)
RBC BLD AUTO: SIGNIFICANT CHANGE UP
RH IG SCN BLD-IMP: POSITIVE — SIGNIFICANT CHANGE UP
SODIUM SERPL-SCNC: 141 MMOL/L — SIGNIFICANT CHANGE UP (ref 135–145)
WBC # BLD: 4 K/UL — SIGNIFICANT CHANGE UP (ref 3.8–10.5)
WBC # BLD: 5.09 K/UL — SIGNIFICANT CHANGE UP (ref 3.8–10.5)
WBC # BLD: 6.85 K/UL — SIGNIFICANT CHANGE UP (ref 3.8–10.5)
WBC # FLD AUTO: 4 K/UL — SIGNIFICANT CHANGE UP (ref 3.8–10.5)
WBC # FLD AUTO: 5.09 K/UL — SIGNIFICANT CHANGE UP (ref 3.8–10.5)
WBC # FLD AUTO: 6.85 K/UL — SIGNIFICANT CHANGE UP (ref 3.8–10.5)

## 2025-02-01 PROCEDURE — 74177 CT ABD & PELVIS W/CONTRAST: CPT | Mod: 26

## 2025-02-01 PROCEDURE — 86077 PHYS BLOOD BANK SERV XMATCH: CPT

## 2025-02-01 PROCEDURE — 99232 SBSQ HOSP IP/OBS MODERATE 35: CPT | Mod: GC

## 2025-02-01 PROCEDURE — 99223 1ST HOSP IP/OBS HIGH 75: CPT

## 2025-02-01 PROCEDURE — 99285 EMERGENCY DEPT VISIT HI MDM: CPT | Mod: GC

## 2025-02-01 RX ORDER — METOPROLOL SUCCINATE 25 MG
50 TABLET, EXTENDED RELEASE 24 HR ORAL DAILY
Refills: 0 | Status: DISCONTINUED | OUTPATIENT
Start: 2025-02-01 | End: 2025-02-04

## 2025-02-01 RX ORDER — BACTERIOSTATIC SODIUM CHLORIDE 0.9 %
500 VIAL (ML) INJECTION ONCE
Refills: 0 | Status: DISCONTINUED | OUTPATIENT
Start: 2025-02-01 | End: 2025-02-01

## 2025-02-01 RX ORDER — FENOFIBRATE 145 MG/1
145 TABLET ORAL DAILY
Refills: 0 | Status: DISCONTINUED | OUTPATIENT
Start: 2025-02-01 | End: 2025-02-01

## 2025-02-01 RX ORDER — FENOFIBRATE 145 MG/1
48 TABLET ORAL DAILY
Refills: 0 | Status: DISCONTINUED | OUTPATIENT
Start: 2025-02-01 | End: 2025-02-04

## 2025-02-01 RX ORDER — ACETAMINOPHEN 160 MG/5ML
650 SUSPENSION ORAL EVERY 6 HOURS
Refills: 0 | Status: DISCONTINUED | OUTPATIENT
Start: 2025-02-01 | End: 2025-02-04

## 2025-02-01 RX ORDER — ATORVASTATIN CALCIUM 80 MG/1
10 TABLET, FILM COATED ORAL AT BEDTIME
Refills: 0 | Status: DISCONTINUED | OUTPATIENT
Start: 2025-02-01 | End: 2025-02-04

## 2025-02-01 RX ORDER — TAMSULOSIN HYDROCHLORIDE 0.4 MG/1
0.4 CAPSULE ORAL AT BEDTIME
Refills: 0 | Status: DISCONTINUED | OUTPATIENT
Start: 2025-02-01 | End: 2025-02-04

## 2025-02-01 RX ORDER — PANTOPRAZOLE 20 MG/1
40 TABLET, DELAYED RELEASE ORAL
Refills: 0 | Status: DISCONTINUED | OUTPATIENT
Start: 2025-02-01 | End: 2025-02-04

## 2025-02-01 RX ORDER — ACETAMINOPHEN, DIPHENHYDRAMINE HCL, PHENYLEPHRINE HCL 325; 25; 5 MG/1; MG/1; MG/1
3 TABLET ORAL AT BEDTIME
Refills: 0 | Status: DISCONTINUED | OUTPATIENT
Start: 2025-02-01 | End: 2025-02-04

## 2025-02-01 RX ORDER — ONDANSETRON 4 MG/1
4 TABLET, ORALLY DISINTEGRATING ORAL ONCE
Refills: 0 | Status: COMPLETED | OUTPATIENT
Start: 2025-02-01 | End: 2025-02-01

## 2025-02-01 RX ADMIN — TAMSULOSIN HYDROCHLORIDE 0.4 MILLIGRAM(S): 0.4 CAPSULE ORAL at 21:27

## 2025-02-01 RX ADMIN — FENOFIBRATE 48 MILLIGRAM(S): 145 TABLET ORAL at 17:53

## 2025-02-01 RX ADMIN — ATORVASTATIN CALCIUM 10 MILLIGRAM(S): 80 TABLET, FILM COATED ORAL at 21:27

## 2025-02-01 RX ADMIN — ONDANSETRON 4 MILLIGRAM(S): 4 TABLET, ORALLY DISINTEGRATING ORAL at 04:47

## 2025-02-01 RX ADMIN — Medication 50 MILLIGRAM(S): at 17:53

## 2025-02-01 NOTE — ED ADULT NURSE NOTE - NSFALLHARMRISKINTERV_ED_ALL_ED
Assistance OOB with selected safe patient handling equipment if applicable/Communicate risk of Fall with Harm to all staff, patient, and family/Monitor gait and stability/Provide patient with walking aids/Provide visual cue: red socks, yellow wristband, yellow gown, etc/Reinforce activity limits and safety measures with patient and family/Bed in lowest position, wheels locked, appropriate side rails in place/Call bell, personal items and telephone in reach/Instruct patient to call for assistance before getting out of bed/chair/stretcher/Non-slip footwear applied when patient is off stretcher/[place] to call system/Physically safe environment - no spills, clutter or unnecessary equipment/Purposeful Proactive Rounding/Room/bathroom lighting operational, light cord in reach

## 2025-02-01 NOTE — CONSULT NOTE ADULT - ASSESSMENT
89M CAD s/p CABG (on ASA), chronic AVM, ascending colon ulcer 3/2024 with hematochezia, diverticulosis, HTN, HLD, BPH here with n/v and hematochezia that started around 2am 2/1/2025.    #Hematochezia  #Macrocytic Anemia  #Hx LGIB - Colonic Ulcer  #Diverticulosis  #CAD s/p CABG (on ASA)    DDx: lower GI bleed includes diverticular hemorrhage (most likely culprit), colon malignancy, hemorrhoids, rectal varices, inflammatory/ischemic/infectious colitis.    Recommendations:   - ok for clear liquid diet today  - trend daily CBC  - obtain iron panel  - Transfuse if hgb < 7 or hgb < 8 in patients with underlying cardiac comorbidities.   - Please make sure patient has adequate peripheral IV access  - no plan for luminal evaluation at this time - pt and family prefer to pursue conservative measures, but are willing to revisit discussion for colonoscopy if clinical need arises  - [place] as per primary team    Celestino Torrez MD  PGY-6 GI/Hepatology Fellow  [employer]

## 2025-02-01 NOTE — ED PROVIDER NOTE - ATTENDING CONTRIBUTION TO CARE
89 male here for 1 episode of bright red blood per rectum occurring approximately 1 AM without abdominal pain.  A colonoscopy 1 year prior.  Colonoscopy from March 25, 2024 demonstrates a single 20 mm ulcer in the ascending colon without stigmata of bleeding.  There also descending colonic polyps.  Patient has diverticulosis.  Denies chest pain, shortness of breath, lightheadedness, dizziness.  Denies trauma.  No other anticoagulants except for aspirin 81 mg the patient has been compliant with.  History complicated by CAD, AVM likely in the colon.    Hemodynamically stable. NAD. AAOx4 (person, place, time, event). PERRL 3mm, EOMI w/o nystagmus, well hydrated, RRR, no audible cardiac murmurs. clear lungs, no inc work of breathing. benign abdomen, - baptiste, no peritoneal signs, no cva ttp. no visible rashes nor deformities, no signs of jaundice, fluid overload, nor anemia. symm calves, no edema. full str/rom/neurovasc all 4 extrem.    Acute GI bleed with history complicated by prior colonic ulcer with possible AVM on antiplatelet agent.  Evaluate for objective anemia.  Likely requiring admission given high risk features.  Plan for labs, type and screen.  No indication for CT scan as benign abdomen.  Summary of presentation, physical exam findings, plan, expected turn around time for diagnostics discussed with patient. Agrees.    I, Carlos Barajas (ED Attending), independently interpreted the EKG:  Interpreted at: 6:56 AM  Sinus rhythm with PVCs, left axis deviation, right bundle branch block, LVH, rate 78, , , QTc 469  No diagnostic ischemic ekg changes.

## 2025-02-01 NOTE — PATIENT PROFILE ADULT - HAS THE PATIENT RECEIVED THE INFLUENZA VACCINE THIS SEASON?
Progress Note - Ralph Drake 76 y o  female MRN: [id number]    Unit/Bed#: [facility] 507-01 Encounter: [id number]      Assessment:  76 F with incarcerated parastomal hernia s/p ex-lap, reduction and parastomal hernia repair with Phasix mesh, LUANN secondary to obstructive uropathy    Plan:  · Continue heparin gtt, will transition to oral agent once tolerating PO  · Discontinue NGT and advance to clears  Would hold on TPN  · Continue elli drain  · Continue ivf per nephro until tolerating PO, appreciate recs  · OOB/Ambulate  · R EJV line in place    Subjective:   No acute events  Passing gas and having BMs  Objective:     Vitals: Blood pressure 149/69, pulse 79, temperature 97 9 °F (36 6 °C), temperature source Oral, resp  rate 18, height 5' (1 524 m), weight 89 2 kg (196 lb 10 4 oz), SpO2 100 %, not currently breastfeeding  ,Body mass index is 38 41 kg/m²  Intake/Output Summary (Last 24 hours) at 8/15/2020 0647  Last data filed at 8/15/2020 0556  Gross per 24 hour   Intake 2455 66 ml   Output 970 ml   Net 1485 66 ml       Physical Exam  GEN: NAD  HEENT: MMM  CV: warm/well perfused  Lung: normal effort  Ab: Soft, NT/ND  ELLI mostly serous  Extrem: No CCE  Neuro:  A+Ox3, motor and sensation grossly intact    Scheduled Meds:  Current Facility-Administered Medications   Medication Dose Route Frequency Provider Last Rate    benzocaine  2 spray Mucosal Once Juan Miller MD      heparin (porcine)  3-30 Units/kg/hr (Order-Specific) Intravenous Titrated Concepcion Ohara MD 14 Units/kg/hr (08/14/20 1525)    HYDROmorphone  0 2 mg Intravenous Q3H PRN Estrellita Blandon MD      HYDROmorphone  0 5 mg Intravenous Q3H PRN Estrellita Blandon MD      HYDROmorphone  0 5 mg Intravenous Q3H PRN Estrellita Blandon MD      metoprolol  5 mg Intravenous Claudette Gaines MD      multi-electrolyte  125 mL/hr Intravenous Continuous [place], [place] 125 mL/hr (08/15/20 0421)    ondansetron  4 mg Intravenous Q4H PRN MD El Honeycutt [employer] (PROTHROMBIN)  35 Units/kg Intravenous Once Wilma Thomas MD       Continuous Infusions:heparin (porcine), 3-30 Units/kg/hr (Order-Specific), Last Rate: 14 Units/kg/hr (08/14/20 1525)  multi-electrolyte, 125 mL/hr, Last Rate: 125 mL/hr (08/15/20 0421)      PRN Meds:  HYDROmorphone    HYDROmorphone    HYDROmorphone    ondansetron      Invasive Devices     Central Venous Catheter Line            CVC Central Lines 08/14/20 Double Right Other (Comment) less than 1 day          Peripheral Intravenous Line            Peripheral IV 08/11/20 Right Hand 3 days    Peripheral IV 08/13/20 Distal;Right;Upper;Ventral (anterior) Arm 1 day    Peripheral IV 08/13/20 Left Antecubital 1 day          Drain            Urostomy Ileal conduit RUQ 1410 days    Closed/Suction Drain Left LLQ Bulb 19 Fr  5 days    NG/OG/Enteral Tube Nasogastric 16 Fr Right nares 1 day                Lab, Imaging and other studies: I have personally reviewed pertinent reports      VTE Pharmacologic Prophylaxis: Heparin  VTE Mechanical Prophylaxis: sequential compression device yes...

## 2025-02-01 NOTE — ED PROVIDER NOTE - PHYSICAL EXAMINATION
GENERAL: Awake, alert, NAD  HEENT: NC/AT, moist mucous membranes  LUNGS: CTAB, no wheezes or crackles   CARDIAC: RRR, no m/r/g  ABDOMEN: Soft, non tender, non distended, no rebound, no guarding  RECTAL: BRBPR, no external fissures or thrombosed hemmroids   EXT: No edema, no calf tenderness,  NEURO: A&Ox3. Moving all extremities.  SKIN: Warm and dry. No rash.  PSYCH: Normal affect.

## 2025-02-01 NOTE — H&P ADULT - NSHPLABSRESULTS_GEN_ALL_CORE
9.9    5.09  )-----------( 172      ( 01 Feb 2025 09:15 )             31.0     02-01    141  |  104  |  32[H]  ----------------------------<  141[H]  3.8   |  23  |  1.34[H]    Ca    8.7      01 Feb 2025 05:36    TPro  7.1  /  Alb  3.7  /  TBili  0.5  /  DBili  x   /  AST  22  /  ALT  9[L]  /  AlkPhos  44  02-01    PT/INR - ( 01 Feb 2025 05:36 )   PT: 13.3 sec;   INR: 1.16 ratio         PTT - ( 01 Feb 2025 05:36 )  PTT:28.8 sec      Venous: 02-01-25 @ 05:36 FiO2: -- Oxygen Sat% 29.5    Urinalysis Basic - ( 01 Feb 2025 05:36 )    Color: x / Appearance: x / SG: x / pH: x  Gluc: 141 mg/dL / Ketone: x  / Bili: x / Urobili: x   Blood: x / Protein: x / Nitrite: x   Leuk Esterase: x / RBC: x / WBC x   Sq Epi: x / Non Sq Epi: x / Bacteria: x          CAPILLARY BLOOD GLUCOSE 9.9    5.09  )-----------( 172      ( 01 Feb 2025 09:15 )             31.0     02-01    141  |  104  |  32[H]  ----------------------------<  141[H]  3.8   |  23  |  1.34[H]    Ca    8.7      01 Feb 2025 05:36    TPro  7.1  /  Alb  3.7  /  TBili  0.5  /  DBili  x   /  AST  22  /  ALT  9[L]  /  AlkPhos  44  02-01    PT/INR - ( 01 Feb 2025 05:36 )   PT: 13.3 sec;   INR: 1.16 ratio         PTT - ( 01 Feb 2025 05:36 )  PTT:28.8 sec      Venous: 02-01-25 @ 05:36 FiO2: -- Oxygen Sat% 29.5    Urinalysis Basic - ( 01 Feb 2025 05:36 )    Color: x / Appearance: x / SG: x / pH: x  Gluc: 141 mg/dL / Ketone: x  / Bili: x / Urobili: x   Blood: x / Protein: x / Nitrite: x   Leuk Esterase: x / RBC: x / WBC x   Sq Epi: x / Non Sq Epi: x / Bacteria: x          CAPILLARY BLOOD GLUCOSE    CTAP personally interpreted - unchanged RLL pulm nodule, pancreatic cyst, R renal cyst. No active extravasation

## 2025-02-01 NOTE — CONSULT NOTE ADULT - ATTENDING COMMENTS
89M, diverticulosis, prior LGIB 2/2 ascending colon ulcer, rest of hx as above, here now with hematochezia and anemia.   Hgb 9.9   LGIB likely 2/2 diverticulosis vs recurrent ischemic colitis / ulcer vs other etiology.   Long discussion w patient and family at bedside. They elect to proceed w conservative management for now.   Trend h/h  monitor bowel movements  ok to start clear liquid diet   if persistent bleeding or hemodynamic instability, please obtain CTA and IR c/s   rest of care per primary team   GI to follow, please call w questions

## 2025-02-01 NOTE — H&P ADULT - NSICDXPASTMEDICALHX_GEN_ALL_CORE_FT
PAST MEDICAL HISTORY:  CAD (coronary artery disease)     Coronary Artery Bypass Surgery 1998    History of BPH     HLD (hyperlipidemia)     HTN (hypertension)     Lumbar stenosis     S/P CABG x 4

## 2025-02-01 NOTE — ED ADULT NURSE REASSESSMENT NOTE - NS ED NURSE REASSESS COMMENT FT1
Report received from AIDEN Trevino. Patient is A+Ox3, sitting up in stretcher. Breathing is spontaneous and unlabored on room air. Skin warm pink and dry. Patient denies abdominal pain but reports continued mild chest discomfort. Patient placed on cardiac monitor. Family at bedside. Plan of care explained. Call bell within reach.

## 2025-02-01 NOTE — H&P ADULT - ASSESSMENT
89M CAD s/p CABG x 4, chronic AVM, ascending colon ulcer 3/2024 with hematochezia, HTN, HLD, BPH here with n/v/d with dark red blood per rectum since yesterday evening concerning for LGIB.    # Hematochezia  - ddx includes AVM, diverticulosis, ulcers, etc; favor LGIB since his emesis is without signs of blood  - GI consulted, f/u recs  - CBC q8, transfuse hgb > 7  - maintain 2 large bore IVs, active T&S  - hold aspirin  - f/u GI PCR    # CABG x 4   - hold aspirin    # HTN  - hold for active bleed    # HLD  - c/w statin    # CKD stage III  - Cr slightly above baseline, monitor  - Monitor labs and urine output  - Avoid NSAIDs, ACEI/ARBS, RCA and nephrotoxins.   - Renally dose medications    # BPH  - c/w tamsulosin 89M CAD s/p CABG x 4, chronic AVM, ascending colon ulcer 3/2024 with hematochezia, HTN, HLD, BPH here with n/v/d with dark red blood per rectum since yesterday evening concerning for LGIB.    # Hematochezia  - ddx includes AVM, diverticulosis, ulcers, etc; favor LGIB since his emesis is without signs of blood  - GI consulted, f/u recs  - CBC q8, transfuse hgb > 7  - maintain 2 large bore IVs, active T&S  - hold aspirin  - f/u GI PCR    # CABG x 4   - hold aspirin    # HTN  - c/w toprol    # HLD  - c/w statin, fenofibrate    # CKD stage III  - Cr slightly above baseline, monitor  - Monitor labs and urine output  - Avoid NSAIDs, ACEI/ARBS, RCA and nephrotoxins.   - Renally dose medications    # BPH  - c/w tamsulosin

## 2025-02-01 NOTE — H&P ADULT - HISTORY OF PRESENT ILLNESS
89M CAD s/p CABG x 4, chronic AVM, ascending colon ulcer 3/2024 with hematochezia, HTN, HLD, BPH here with n/v/d with dark red blood per rectum since yesterday. Pt had at least 3 episodes of nonbloody emesis and continual loose stools with dark red colored blood. Pt denies any lightheadedness, dizziness, chest pain, or palpitations.     Pt has a hx of GI bleed with admission to [facility] 03/2024 for similar symptoms. At that time, CT angio  was positive for ascending colon active extravasation and pt underwent colonoscopy which showed a 20mm ulcer in ascending colon which was likely source of bleeding. Etiology of ulcer was likely ischemic. Diverticulosis was seen diffusely as well.     In the ED, initial /72, HR 75, RR 20, afebrile, SaO2 100% on RA.

## 2025-02-01 NOTE — ED PROVIDER NOTE - OBJECTIVE STATEMENT
89-year-old male history of CAD, chronic AVM, 20 mm ulcer in ascending colon presenting to the ED with bright red blood per rectum starting at 1 AM.  Patient had colonoscopy in March 2024 with the above findings.  Patient takes aspirin 81 mg daily but no anticoagulant.  Patient endorses nausea and vomiting but denies abdominal pain.  Endorses diarrhea starting last night with blood filling the toilet.  He denies any chest pain, shortness of breath, fever, lightheadedness.

## 2025-02-01 NOTE — ED PROVIDER NOTE - CLINICAL SUMMARY MEDICAL DECISION MAKING FREE TEXT BOX
Patient is resting comfortably in bed, vital stable, no acute distress.  Abdomen soft nontender to palpation.  Rectal exam with bright red blood per rectum no external thrombosed hemorrhoids or fissures visualized.  Given his history of AVM and colonic ulcer on antiplatelet therapy.  Will obtain labs to evaluate for anemia, CT abdomen and pelvis, will trend H&H to evaluate for active bleed.  Patient will need to be admitted due to high risk GI bleed.

## 2025-02-01 NOTE — CONSULT NOTE ADULT - SUBJECTIVE AND OBJECTIVE BOX
89 year old male with a history of CAD S/P CABG 27 years ago.  He also has a history of bleeding diverticulosis and AVM's in March 2024.  He now presents with rectal bleeding maroon colored as well as nausea and vomiting without hematemesis.  He denies abdominal pain or fever.  One episode of chest pain which is likely due to the nausea and vomiting  No dyspnea.  His baseline Hgb is 10-10.5 g    Meds:  ASA 81 mg qd             Atorvastatin 10 mg qd             Metoprolol ER 25 mg qd             Fenofibrate 160 mg qd             Flomax 0.4 mg qd             Omeprazole 40 mg qd    /78  HR 75 sinus  Lungs clear  RR 1/6 systolic murmur  !+ edema on the right and 1-2+ edema on the left    EKG:  NSR with PAC's RBBB  LAHB  T wave inversions I, AvL, QS in II, III, AvF    WBC 6.85  Hgb 10.6  Hct 33.7 Plt 210K  BUN 32  Crt 1.34  Troponin 35    Imp:  CAD S/P CABG - stable  Acute rectal bleeding - possibly due to diverticulosis or AVM's   OK to hold ASA for a couple of days     Serial CBC's    CT abdomen  GI evaluation - [facility]    
Chief Complaint:  Patient is a 89y old  Male who presents with a chief complaint of hematochezia (01 Feb 2025 14:01)      HPI:  89M CAD s/p CABG (on ASA), chronic AVM, ascending colon ulcer 3/2024 with hematochezia, diverticulosis, HTN, HLD, BPH here with n/v and hematochezia that started around 2am 2/1/2025. Pt had at least 3 episodes of nonbloody emesis and continual loose stools with dark red colored blood. Pt denies any lightheadedness, dizziness, chest pain, or palpitations. Hb stable. No transfusion requirements. Pt without BM since arrival to ER. Last PO Friday night 8pm.    Pt has a hx of GI bleed with admission to [facility] 03/2024 for similar symptoms. At that time, CT angio  was positive for ascending colon active extravasation and pt underwent colonoscopy which showed a 20mm ulcer in ascending colon which was likely source of bleeding. Etiology of ulcer was likely ischemic. Diverticulosis was seen diffusely as well. Today on assessment pt denies n/f/c/dizziness/AP. He follows [facility] as an outpatient. Its been years since his last EGD.    In the ED, initial /72, HR 75, RR 20, afebrile, SaO2 100% on RA.    Allergies:  penicillins (Vomiting)  adhesives (Rash)    Hospital Medications:  acetaminophen     Tablet .. 650 milliGRAM(s) Oral every 6 hours PRN  atorvastatin 10 milliGRAM(s) Oral at bedtime  fenofibrate Tablet 48 milliGRAM(s) Oral daily  melatonin 3 milliGRAM(s) Oral at bedtime PRN  metoprolol succinate ER 50 milliGRAM(s) Oral daily  pantoprazole    Tablet 40 milliGRAM(s) Oral before breakfast  tamsulosin 0.4 milliGRAM(s) Oral at bedtime      PMHX/PSHX:  Coronary Artery Bypass Surgery    BPH (Benign Prostatic Hyperplasia)    Hyperlipidemia    HTN (Hypertension)    CAD (Coronary Artery Disease)    Lumbar Spinal Stenosis    S/P CABG x 4    History of BPH    HTN (hypertension)    HLD (hyperlipidemia)    CAD (coronary artery disease)    Lumbar stenosis    CBG (Coronary Bypass Graft)    Knee Joint Replacement    H/O hemorrhoidectomy    Arteriovenous malformation (AVM)        Family history:  Family history of CHF (congestive heart failure) (Mother)    Social History:     Tob: Denies  EtOH: Denies  Illicit Drugs: Denies    ROS:   General:  No wt loss, fevers, chills, night sweats, fatigue  Eyes:  Good vision, no reported pain  ENT:  No sore throat, pain, runny nose, dysphagia  CV:  No pain, palpitations, hypo/hypertension  Pulm:  No dyspnea, cough, tachypnea, wheezing  GI:  As per HPI  :  No pain, bleeding, incontinence, nocturia  Muscle:  No pain, weakness  Neuro:  No weakness, tingling, memory problems  Psych:  No fatigue, insomnia, mood problems, depression  Endocrine:  No polyuria, polydipsia, cold/heat intolerance  Heme:  No petechiae, ecchymosis, easy bruisability  Skin:  No rash, tattoos, scars, edema    PHYSICAL EXAM:   GENERAL:  No acute distress  HEENT:  Normocephalic/atraumatic  CHEST:  No accessory muscle use  HEART:  Regular rate and rhythm  ABDOMEN:  Soft, non-tender, non-distended  EXTREMITIES: No b/l LE edema  SKIN:  No rash  NEURO:  Alert and oriented x 3    Vital Signs:  Vital Signs Last 24 Hrs  T(C): 37.2 (01 Feb 2025 16:04), Max: 37.2 (01 Feb 2025 07:23)  T(F): 99 (01 Feb 2025 16:04), Max: 99 (01 Feb 2025 16:04)  HR: 65 (01 Feb 2025 16:04) (65 - 82)  BP: 130/69 (01 Feb 2025 16:04) (130/69 - 158/72)  BP(mean): 94 (01 Feb 2025 12:24) (88 - 98)  RR: 20 (01 Feb 2025 16:04) (18 - 20)  SpO2: 98% (01 Feb 2025 16:04) (95% - 100%)    Parameters below as of 01 Feb 2025 16:04  Patient On (Oxygen Delivery Method): room air      Daily Height in cm: 167.64 (01 Feb 2025 03:47)    Daily     LABS:                        9.9    4.00  )-----------( 182      ( 01 Feb 2025 17:27 )             30.5     Mean Cell Volume: 101.3 fl (02-01-25 @ 17:27)    02-01    141  |  104  |  32[H]  ----------------------------<  141[H]  3.8   |  23  |  1.34[H]    Ca    8.7      01 Feb 2025 05:36    TPro  7.1  /  Alb  3.7  /  TBili  0.5  /  DBili  x   /  AST  22  /  ALT  9[L]  /  AlkPhos  44  02-01    LIVER FUNCTIONS - ( 01 Feb 2025 05:36 )  Alb: 3.7 g/dL / Pro: 7.1 g/dL / ALK PHOS: 44 U/L / ALT: 9 U/L / AST: 22 U/L / GGT: x           PT/INR - ( 01 Feb 2025 05:36 )   PT: 13.3 sec;   INR: 1.16 ratio         PTT - ( 01 Feb 2025 05:36 )  PTT:28.8 sec  Urinalysis Basic - ( 01 Feb 2025 05:36 )    Color: x / Appearance: x / SG: x / pH: x  Gluc: 141 mg/dL / Ketone: x  / Bili: x / Urobili: x   Blood: x / Protein: x / Nitrite: x   Leuk Esterase: x / RBC: x / WBC x   Sq Epi: x / Non Sq Epi: x / Bacteria: x                              9.9    4.00  )-----------( 182      ( 01 Feb 2025 17:27 )             30.5                         9.9    5.09  )-----------( 172      ( 01 Feb 2025 09:15 )             31.0                         10.6   6.85  )-----------( 210      ( 01 Feb 2025 05:36 )             33.7       Imaging:  < from: CT Abdomen and Pelvis w/ IV Cont (02.01.25 @ 10:41) >  FINDINGS:  LOWER CHEST: Aortic and coronary artery calcifications. Cardiomegaly.   Right lower lobe pulmonary nodules measuring up to 1.6 cm (301, 16),   unchanged.    LIVER: Within normal limits.  BILE DUCTS: Normal caliber.  GALLBLADDER: Cholelithiasis.  SPLEEN: Within normal limits.  PANCREAS: A 2.7 cm pancreatic head cyst is unchanged. No main duct   dilatation or suspicious enhancement.  ADRENALS: Within normal limits.  KIDNEYS/URETERS: Right renal cyst. No hydronephrosis.    BLADDER: Within normal limits.  REPRODUCTIVE ORGANS: Prostate is not enlarged.    BOWEL: No evidence of active GI bleed. Scattered colonic diverticula   without evidence of acute diverticulitis. No bowel obstruction. Appendix   is not visualized. No evidence of inflammation in the pericecal region.  PERITONEUM/RETROPERITONEUM: No ascites or pneumoperitoneum.   Retroperitoneal lipomatosis again noted.  VESSELS: Diffuse atherosclerotic changes.  LYMPH NODES: No lymphadenopathy.  ABDOMINAL WALL: Small bilateral fat-containing inguinal hernias, right   greater than left.  BONES: Degenerative changes.    IMPRESSION:  No evidence of active GI hemorrhage.    Other findings are unchanged, including right lower lobe pulmonary   nodules and pancreatic cyst.    < end of copied text >

## 2025-02-01 NOTE — ED ADULT NURSE NOTE - OBJECTIVE STATEMENT
89y M A&Ox4 c/o rectal bleeding. pt states had a bag of popcorn last night and woke up around 0100 this morning with rectal bleeding. pt states 89y M A&Ox4 c/o rectal bleeding. pt states went out about his day yestereday and then around 1800 went to sleep waking up at 0100 with a bowel movement that had diarrhea and Blood in the stool. pt states blood was dark in color. pt states takes baby aspirin daily.  upon assesesment pt PMS intact, gross neuro intact, Moves all extremeties, Edema noted to pt left leg, blood noted in stool, hernia noted to pt abdomen. pt denies: fever, HA, Lightheadness, Dizzines, CP, SOB,  symptoms, Sick contacts. PMH

## 2025-02-01 NOTE — H&P ADULT - NSHPPHYSICALEXAM_GEN_ALL_CORE
Vital Signs Last 24 Hrs  T(C): 37.1 (01 Feb 2025 12:24), Max: 37.2 (01 Feb 2025 07:23)  T(F): 98.8 (01 Feb 2025 12:24), Max: 98.9 (01 Feb 2025 07:23)  HR: 66 (01 Feb 2025 12:24) (66 - 75)  BP: 133/77 (01 Feb 2025 12:24) (132/69 - 158/72)  BP(mean): 94 (01 Feb 2025 12:24) (88 - 98)  RR: 20 (01 Feb 2025 12:24) (18 - 20)  SpO2: 95% (01 Feb 2025 12:24) (95% - 100%)    Parameters below as of 01 Feb 2025 12:24  Patient On (Oxygen Delivery Method): room air    GENERAL: NAD, well-developed  HEAD:  Atraumatic, Normocephalic  EYES: EOMI, PERRLA, conjunctiva and sclera clear  NECK: Supple, No JVD  CHEST/LUNG: Clear to auscultation bilaterally; No wheeze  HEART: Regular rate and rhythm; No murmurs, rubs, or gallops  ABDOMEN: Soft, Nontender, Nondistended; Bowel sounds present  EXTREMITIES:  2+ Peripheral Pulses, No clubbing, cyanosis, or edema  PSYCH: AAOx3, appropriate affect  NEUROLOGY: non-focal, pan  SKIN: No rashes or lesions Vital Signs Last 24 Hrs  T(C): 37.1 (01 Feb 2025 12:24), Max: 37.2 (01 Feb 2025 07:23)  T(F): 98.8 (01 Feb 2025 12:24), Max: 98.9 (01 Feb 2025 07:23)  HR: 66 (01 Feb 2025 12:24) (66 - 75)  BP: 133/77 (01 Feb 2025 12:24) (132/69 - 158/72)  BP(mean): 94 (01 Feb 2025 12:24) (88 - 98)  RR: 20 (01 Feb 2025 12:24) (18 - 20)  SpO2: 95% (01 Feb 2025 12:24) (95% - 100%)    Parameters below as of 01 Feb 2025 12:24  Patient On (Oxygen Delivery Method): room air    GENERAL: NAD, appears younger than stated age  HEAD: NCAT  EYES: EOMI, PERRL, conjunctiva and sclera clear  NECK: Supple, No JVD  CHEST/LUNG: Clear to auscultation bilaterally; No wheeze  HEART: Regular rate and rhythm; No murmurs, rubs, or gallops  ABDOMEN: Soft, Nontender, Nondistended; Bowel sounds present  EXTREMITIES:  left lower ext chronically swollen, varicose veins  PSYCH: AAOx3, appropriate affect  NEUROLOGY: non-focal, pan  SKIN: No rashes or lesions

## 2025-02-01 NOTE — PATIENT PROFILE ADULT - DO YOU EVER NEED HELP READING HOSPITAL MATERIALS?
change of breathing pattern/cough/gurgly voice/fever/pneumonia/throat clearing/upper respiratory infection
no

## 2025-02-01 NOTE — PATIENT PROFILE ADULT - FALL HARM RISK - HARM RISK INTERVENTIONS

## 2025-02-01 NOTE — ED ADULT NURSE REASSESSMENT NOTE - NS ED NURSE REASSESS COMMENT FT1
Patient had BM, cleaned and repositioned by ED tech. Per ED tech, patient had small nonbloody bowl movement.

## 2025-02-01 NOTE — ED PROVIDER NOTE - WET READ LAUNCH FT
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     She has had multiple abscesses and in November had large abscess requiring I/D by Dr. Mcnair.  She is here asking for guidance for prevention of recurrent infections.  She has never tested nasal MRSA positive but has previous positive cultures.    She has stopped waxing and shaving. Has had no more episodes since.  Has small breakouts/pimples occasionally.  Lives with daughter and .    Develops yeast infection to antibiotics.      PAST MEDICAL HISTORY:  Past Medical History   Diagnosis Date   • Allergic conjunctivitis of both eyes 1/14/2017   • Chronic pain syndrome    • Dissociative disorder      (sees psychiatry)   • Dysmenorrhea    • Ehler's-Danlos syndrome    • Eustachian tube dysfunction    • Fibromyalgia    • GERD (gastroesophageal reflux disease)    • History of right foot drop      peroneal nerve palsy (sees Neurology)   • Kidney stones    • Menorrhagia    • Osteopenia 11/10/2010   • Overactive bladder    • Psychological stress      related to sons mental illness   • PTSD (post-traumatic stress disorder) 1/31/2017   • 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   • Reactive airway disease    • Seizures    • Syncope and collapse    • Vision, reduced        PAST SURGICAL HISTORY:  Past Surgical History   Procedure Laterality Date   • Hysterectomy  01/12/2012     LS supracervical, ovaries intact   • Colonoscopy w biopsy  07/10/2008   • Lymph node dissection     • Tympanostomy       PE Tubes, eustachian tube placement   •  Ureteral stent placement     • Tonsillectomy and adenoidectomy     • Hernia repair         SOCIAL HISTORY:  Social History     Social History   • Marital status:      Spouse name: Marcus Behr   • Number of children: 2   • Years of education: N/A     Occupational History   • CNA      Social History Main Topics   • Smoking status: Current Every Day Smoker     Packs/day: 1.00     Years: 16.00     Types: Cigarettes   • Smokeless tobacco: Never Used      Comment: working on quitting   • Alcohol use No   • Drug use: No   • Sexual activity: Yes     Partners: Male     Other Topics Concern   • Not on file     Social History Narrative       FAMILY HISTORY:  Family History   Problem Relation Age of Onset   • OTHER Mother      Nancy-Danlos Syndrome   • Breast cancer Mother    • High blood pressure Father    • Migraines Father    • Breast cancer Maternal Grandmother 35   • High blood pressure Maternal Grandmother    • Stroke Maternal Grandmother    • Migraines Maternal Grandmother    • Stroke Paternal Grandmother    • High blood pressure Paternal Grandmother    • Diabetes Paternal Grandmother    • Heart attack Paternal Grandmother    • Parkinsonism Paternal Grandmother    • Heart disease Maternal Grandfather    • Early death Paternal Grandfather    • Heart disease Paternal Grandfather    • Migraines Sister    • Migraines Son    • Mental retardation Other    • Stroke Paternal Aunt    • Seizures Neg Hx    • Ataxia Neg Hx    • Chorea Neg Hx    • Dementia Neg Hx    • Mult Sclerosis Neg Hx    • Neurofibromatosis Neg Hx    • Neuropathy Neg Hx        ALLERGIES:   Allergen Reactions   • Bee Sting ANAPHYLAXIS   • Chantix [Varenicline Tartrate] ANXIETY     Jaw locked, swelling, throat closed.   • Compazine      Mouth lock, jaw locked, swelling, throat closed.   • Latex RASH       OUTPATIENT MEDICATIONS:    Current Outpatient Prescriptions on File Prior to Visit:  fluticasone (CUTIVATE) 0.05 % cream   oxybutynin (DITROPAN) 5 MG  tablet   Olopatadine HCl (PATADAY) 0.2 % ophthalmic solution   fluticasone-salmeterol (ADVAIR DISKUS) 250-50 MCG/DOSE AEROSOL POWDER, BREATH ACTIVATED   pseudoephedrine-guaifenesin (MUCINEX D)  MG per tablet   busPIRone (BUSPAR) 10 MG tablet   escitalopram (LEXAPRO) 10 MG tablet   gabapentin (NEURONTIN) 800 MG tablet   albuterol 108 (90 BASE) MCG/ACT inhaler   Echinacea 350 MG Cap   acetaminophen (TYLENOL) 325 MG tablet   ibuprofen (MOTRIN) 200 MG tablet   pantoprazole (PROTONIX) 40 MG tablet   QUEtiapine (SEROQUEL) 400 MG tablet   ondansetron (ZOFRAN ODT) 4 MG disintegrating tablet   carBAMazepine (TEGRETOL) 200 MG tablet   naproxen (NAPROSYN) 500 MG tablet   EPINEPHrine (EPIPEN 2-DELFINA) 0.3 MG/0.3ML Solution Auto-injector   vitamin E 100 UNITS capsule   ascorbic acid (VITAMIN C) 1000 MG tablet   Calcium Carbonate-Vitamin D (CALCIUM + D) 600-200 MG-UNIT TABS   Multiple Vitamins-Minerals (ECHINACEA ACZ PO)   fish oil-omega-3 fatty acids 1000 MG CAPS   camphor-menthol (SARNA) lotion   cloNIDine (CATAPRES) 0.1 MG tablet   QUEtiapine (SEROQUEL) 25 MG tablet     No current facility-administered medications on file prior to visit.     Review of Systems:   Eye Problem(s):Negative.  ENT Problem(s):Negative.   Cardiovascular problem(s):Negative.   Respiratory problem(s):Negative.  Gastrointestinal problem(s):Negative.  Genitourinary problem(s):Negative.   Musculoskeletal problem(s):Negative.  Integumentary problem(s):Negative.  Neurological problem(s):Negative.  Psychiatric problem(s):Negative.   Endocrine problem(s):Negative.   Hematologic and/or Lymphatic problem(s):Negative.  14 point review of system was obtained and is negative except for pertinent positives mentioned here or in the history of present illness.    Vital Signs:   Visit Vitals   • /80 (BP Location: [facility], Patient Position: Sitting, Cuff Size: Regular)   • Pulse 78   • Resp 18   • Ht 5' 5\" (1.651 m)   • Wt 74.8 kg  Comment: stated   • BMI 27.46  kg/m2          Physical Exam:    General : Awake, no acute distress, appears comfortable.  HEENT: PERRL (Pupils equal, round and reactive to light).  No icterus, no oral thrush, neck supple, no axillary or neck lymphadenopathy.  Cardiovascular system:  Pulse regular, S1, S2 normal, no murmurs , no edema, no bruits.  Pulmonary Normal respiratory effort, Clear to auscultation, no wheezes, no rales.  Gastrointestinal: Soft , nontender, no hepatosplenmegaly, bowel sounds normoactive.  Genitourinary: No Wick catheter. No costovertebral angle or suprapubic tenderness.    Extremities: No cyanosis, or clubbing, normal range of motion.  Skin: No rashes or induration.  Psychiatric: Oriented to name, place and time, normal affect and mood.  Lines: none     Labs   Reviewed       Lab Results   Component Value Date    VANCR 6.9 11/16/2016           Microbiology:  Reviewed  Nasal MRSA: neg  11/2016 Wound Cx: MRSA (Rifampin resistant)    Radiology/Imaging:   I personally reviewed the following  No pertinent studies      Assessment:   Recurrent skin and soft tissue infections due to MRSA  Folliculitis       Plan & Recommendations:   Recommend Hibiclens washes once a week.     Would not recommend decolonization protocol as it has only 50% rate of success and patient wants to limit antibiotic use.    Continue to limit shaving and waxing as she is.    Can see me PRN.           CC: MD Dr. David Bah MD, thank you so much for allowing me the opportunity to assist you in the care of this patient.   Please contact me if you have any questions or concerns at Pager #[phone]    Hansa Wheeler MD  2/8/2017 11:32 AM       There are no Wet Read(s) to document.

## 2025-02-01 NOTE — PATIENT PROFILE ADULT - NSPRESCRALCFREQ_GEN_A_NUR
Hypertriglyceridemia Monitoring: I explained this is common when taking isotretinoin. We will monitor closely. Never

## 2025-02-02 DIAGNOSIS — K92.2 GASTROINTESTINAL HEMORRHAGE, UNSPECIFIED: ICD-10-CM

## 2025-02-02 LAB
ALBUMIN SERPL ELPH-MCNC: 3.2 G/DL — LOW (ref 3.3–5)
ALP SERPL-CCNC: 33 U/L — LOW (ref 40–120)
ALT FLD-CCNC: 10 U/L — SIGNIFICANT CHANGE UP (ref 10–45)
ANION GAP SERPL CALC-SCNC: 8 MMOL/L — SIGNIFICANT CHANGE UP (ref 5–17)
AST SERPL-CCNC: 19 U/L — SIGNIFICANT CHANGE UP (ref 10–40)
BILIRUB SERPL-MCNC: 0.4 MG/DL — SIGNIFICANT CHANGE UP (ref 0.2–1.2)
BUN SERPL-MCNC: 22 MG/DL — SIGNIFICANT CHANGE UP (ref 7–23)
CALCIUM SERPL-MCNC: 8.2 MG/DL — LOW (ref 8.4–10.5)
CHLORIDE SERPL-SCNC: 107 MMOL/L — SIGNIFICANT CHANGE UP (ref 96–108)
CO2 SERPL-SCNC: 25 MMOL/L — SIGNIFICANT CHANGE UP (ref 22–31)
CREAT SERPL-MCNC: 1.32 MG/DL — HIGH (ref 0.5–1.3)
EGFR: 51 ML/MIN/1.73M2 — LOW
FERRITIN SERPL-MCNC: 74 NG/ML — SIGNIFICANT CHANGE UP (ref 30–400)
FOLATE SERPL-MCNC: 7.6 NG/ML — SIGNIFICANT CHANGE UP
GI PCR PANEL: DETECTED
GLUCOSE SERPL-MCNC: 92 MG/DL — SIGNIFICANT CHANGE UP (ref 70–99)
HCT VFR BLD CALC: 29 % — LOW (ref 39–50)
HCT VFR BLD CALC: 30.7 % — LOW (ref 39–50)
HGB BLD-MCNC: 9.2 G/DL — LOW (ref 13–17)
HGB BLD-MCNC: 9.6 G/DL — LOW (ref 13–17)
IRON SATN MFR SERPL: 13 % — LOW (ref 16–55)
IRON SATN MFR SERPL: 39 UG/DL — LOW (ref 45–165)
MAGNESIUM SERPL-MCNC: 1.6 MG/DL — SIGNIFICANT CHANGE UP (ref 1.6–2.6)
MCHC RBC-ENTMCNC: 31.3 G/DL — LOW (ref 32–36)
MCHC RBC-ENTMCNC: 31.7 G/DL — LOW (ref 32–36)
MCHC RBC-ENTMCNC: 31.8 PG — SIGNIFICANT CHANGE UP (ref 27–34)
MCHC RBC-ENTMCNC: 32.5 PG — SIGNIFICANT CHANGE UP (ref 27–34)
MCV RBC AUTO: 101.7 FL — HIGH (ref 80–100)
MCV RBC AUTO: 102.5 FL — HIGH (ref 80–100)
NOROVIRUS GI+II RNA STL QL NAA+NON-PROBE: DETECTED
NRBC # BLD: 0 /100 WBCS — SIGNIFICANT CHANGE UP (ref 0–0)
NRBC # BLD: 0 /100 WBCS — SIGNIFICANT CHANGE UP (ref 0–0)
NRBC BLD-RTO: 0 /100 WBCS — SIGNIFICANT CHANGE UP (ref 0–0)
NRBC BLD-RTO: 0 /100 WBCS — SIGNIFICANT CHANGE UP (ref 0–0)
PHOSPHATE SERPL-MCNC: 2.4 MG/DL — LOW (ref 2.5–4.5)
PLATELET # BLD AUTO: 151 K/UL — SIGNIFICANT CHANGE UP (ref 150–400)
PLATELET # BLD AUTO: 176 K/UL — SIGNIFICANT CHANGE UP (ref 150–400)
POTASSIUM SERPL-MCNC: 3.5 MMOL/L — SIGNIFICANT CHANGE UP (ref 3.5–5.3)
POTASSIUM SERPL-SCNC: 3.5 MMOL/L — SIGNIFICANT CHANGE UP (ref 3.5–5.3)
PROT SERPL-MCNC: 6 G/DL — SIGNIFICANT CHANGE UP (ref 6–8.3)
RBC # BLD: 2.83 M/UL — LOW (ref 4.2–5.8)
RBC # BLD: 3.02 M/UL — LOW (ref 4.2–5.8)
RBC # FLD: 15.1 % — HIGH (ref 10.3–14.5)
RBC # FLD: 15.4 % — HIGH (ref 10.3–14.5)
SODIUM SERPL-SCNC: 140 MMOL/L — SIGNIFICANT CHANGE UP (ref 135–145)
TIBC SERPL-MCNC: 310 UG/DL — SIGNIFICANT CHANGE UP (ref 220–430)
UIBC SERPL-MCNC: 271 UG/DL — SIGNIFICANT CHANGE UP (ref 110–370)
VIT B12 SERPL-MCNC: 171 PG/ML — LOW (ref 232–1245)
WBC # BLD: 3 K/UL — LOW (ref 3.8–10.5)
WBC # BLD: 4.34 K/UL — SIGNIFICANT CHANGE UP (ref 3.8–10.5)
WBC # FLD AUTO: 3 K/UL — LOW (ref 3.8–10.5)
WBC # FLD AUTO: 4.34 K/UL — SIGNIFICANT CHANGE UP (ref 3.8–10.5)

## 2025-02-02 RX ORDER — CYANOCOBALAMIN (VITAMIN B-12) 1000MCG/ML
1000 VIAL (ML) INJECTION DAILY
Refills: 0 | Status: COMPLETED | OUTPATIENT
Start: 2025-02-02 | End: 2025-02-03

## 2025-02-02 RX ADMIN — TAMSULOSIN HYDROCHLORIDE 0.4 MILLIGRAM(S): 0.4 CAPSULE ORAL at 21:22

## 2025-02-02 RX ADMIN — Medication 1000 MICROGRAM(S): at 15:56

## 2025-02-02 RX ADMIN — Medication 50 MILLIGRAM(S): at 05:33

## 2025-02-02 RX ADMIN — FENOFIBRATE 48 MILLIGRAM(S): 145 TABLET ORAL at 13:10

## 2025-02-02 RX ADMIN — PANTOPRAZOLE 40 MILLIGRAM(S): 20 TABLET, DELAYED RELEASE ORAL at 05:34

## 2025-02-02 RX ADMIN — ATORVASTATIN CALCIUM 10 MILLIGRAM(S): 80 TABLET, FILM COATED ORAL at 21:22

## 2025-02-02 NOTE — PHYSICAL THERAPY INITIAL EVALUATION ADULT - PERTINENT HX OF CURRENT PROBLEM, REHAB EVAL
89M CAD s/p CABG x 4, chronic AVM, ascending colon ulcer 3/2024 with hematochezia, HTN, HLD, BPH here with n/v/d with dark red blood per rectum since yesterday evening concerning for LGIB.

## 2025-02-02 NOTE — PHYSICAL THERAPY INITIAL EVALUATION ADULT - ADDITIONAL COMMENTS
IND with rollator at baseline; lives with spouse with ramp to enter, no stairs inside; IND with ADLs/mobility at baseline, no hx of falls x3 months IND with rollator at baseline; lives with spouse with ramp to enter, no stairs inside; IND with ADLs/mobility at baseline, no hx of falls x3 months; reports was going to Outpatient PT 2x/week for balance PTA

## 2025-02-02 NOTE — PHYSICAL THERAPY INITIAL EVALUATION ADULT - ASSISTIVE DEVICE FOR TRANSFER: STAND/SIT, REHAB EVAL
Cardiac Electrophysiology OFFICE Note Subjective:  
  
Dwight Kendrick is a 68 y.o. patient who presents for follow up of PAF, NICM, &  Medtronic dual chamber pacemaker (DOI 07/20/2020). NICM, LVEF 40% via echo in 03/2021. NYHA III chronic systolic CHF. On Toprol XL, but no ACEi/ARB. Device check today indicates persistent atrial flutter since 12/26/2020, some RVR. AF/AFL burden significantly increased compared to previous. Some RVR. He endorses lower extremity edema, SOB walking from room to room. He denies chest pain, PND, orthopnea, lightheadedness, or syncope. BP elevated today. Anticoagulated with Xarelto, had some problems with hematuria, but none in the past few weeks. He had prostatectomy in the past few months. Previous: 
Mild short term memory deficit per PCP notes. Father had CVA. Problem List  Date Reviewed: 11/3/2020 Codes Class Noted Pacemaker ICD-10-CM: Z95.0 ICD-9-CM: V45.01  7/20/2020 Overview Signed 7/20/2020  1:44 PM by Ruby Wright MD  
  7/20/2020 Medtronic dual chamber Chronotropic incompetence with sinus node dysfunction (HCC) ICD-10-CM: I49.5 ICD-9-CM: 427.81  7/20/2020 PAF (paroxysmal atrial fibrillation) (HCC) ICD-10-CM: I48.0 ICD-9-CM: 427.31  Unknown Atrial fibrillation, chronic (HCC) ICD-10-CM: I48.20 ICD-9-CM: 427.31  Unknown Pancytopenia ([facility] [address] ICD-10-CM: Z01.837 ICD-9-CM: 284.19  12/18/2018 Other neutropenia ([facility] [address] ICD-10-CM: D70.8 ICD-9-CM: 288.09  12/15/2017 Overview Signed 12/15/2017  1:40 PM by Manjit Logan MD  
  Since 2013 with neg JESSY and normal B12 Bradycardia, sinus ICD-10-CM: R00.1 ICD-9-CM: 427.89  12/21/2015 Bilateral inguinal hernia ICD-10-CM: K40.20 ICD-9-CM: 550.92  12/26/2013 HTN (hypertension) ICD-10-CM: I10 
ICD-9-CM: 401.9  9/24/2011 Panic disorder ICD-10-CM: F41.0 ICD-9-CM: 300.01  9/24/2011  Hyperlipidemia ICD-10-CM: E78.5 ICD-9-CM: 272.4  9/24/2011 BPH (benign prostatic hyperplasia) ICD-10-CM: N40.0 ICD-9-CM: 600.00  9/24/2011 Current Outpatient Medications Medication Sig Dispense Refill  amiodarone (CORDARONE) 200 mg tablet 200 mg janeth a day for 2 week: Then 200 mg once daily 104 Tab 3  
 lisinopriL (PRINIVIL, ZESTRIL) 20 mg tablet Take 1 Tab by mouth daily. 90 Tab 3  furosemide (Lasix) 20 mg tablet Take 20 mg by mouth daily.  doxycycline (MONODOX) 100 mg capsule Take 100 mg by mouth two (2) times a day.  sertraline (ZOLOFT) 100 mg tablet TAKE 1 TABLET BY MOUTH DAILY 90 Tab 1  
 finasteride (PROSCAR) 5 mg tablet TK 1 T PO D    
 metoprolol succinate (TOPROL-XL) 50 mg XL tablet Take 1 Tab by mouth daily. 90 Tab 3  Xarelto 20 mg tab tablet TAKE 1 TABLET BY MOUTH DAILY WITH DINNER 90 Tab 1  
 tamsulosin (FLOMAX) 0.4 mg capsule Take 2 Caps by mouth daily. 180 Cap 11 No Known Allergies Past Medical History:  
Diagnosis Date  Arthritis Left knee Probable OA  Atrial fibrillation, chronic ([address]  Bilateral inguinal hernia 12/26/2013  Bradycardia, sinus 12/21/2015  Fracture of greater trochanter of left femur ([address] 12/2020  
 HTN (hypertension) 9/24/2011  Hyperlipidemia 9/24/2011  Hypertension  Inguinal hernia  Other ill-defined conditions(799.89)   
 high cholesterol  Other ill-defined conditions(799.89)   
 benign prostatic hypertrophy  Other neutropenia ([address] 12/15/2017 Since 2013 with neg JESSY and normal B12  
 PAF (paroxysmal atrial fibrillation) ([address]  Pancytopenia ([address] 12/18/2018  Panic disorder 9/24/2011 Past Surgical History:  
Procedure Laterality Date  HX KNEE ARTHROSCOPY    
 HX UROLOGICAL    
 benign prostate bx  AL INS NEW/RPLCMT PRM PM W/TRANSV ELTRD ATRIAL&VENT  7/20/2020  AL INS NEW/RPLCMT PRM PM W/TRANSV ELTRD ATRIAL&VENT N/A 7/20/2020  INSERT PPM DUAL performed by Wanda Singh MD at [facility] CARDIAC CATH LAB Family History Problem Relation Age of Onset  Stroke Father   
      76 cva Social History Tobacco Use  Smoking status: Former Smoker  Smokeless tobacco: Never Used Substance Use Topics  Alcohol use: Not Currently Review of Systems: All other review systems otherwise negative. Constitutional: Negative for fever, chills, weight loss, + malaise/fatigue. HEENT: Negative for nosebleeds, vision changes. Respiratory: Negative for cough, hemoptysis. Cardiovascular: Negative for chest pain, palpitations, dizziness and no orthopnea, claudication, + leg swelling, no syncope, and no PND. + BOLANOS Gastrointestinal: Negative for nausea, vomiting, diarrhea, blood in stool and melena. Genitourinary: Negative for dysuria, and previous hematuria now resolved. Musculoskeletal: Negative for myalgias, + bilateral knee arthralgia. Skin: Negative for rash. Heme: Does not bleed or bruise easily. Neurological: Negative for speech change and focal weakness. Objective:  
 
Visit Vitals BP (!) 162/98 Pulse 72 Ht 5' 9\" (1.753 m) Wt 175 lb (79.4 kg) BMI 25.84 kg/m² Physical Exam:  
Constitutional: Well-developed and well-nourished. No respiratory distress. Head: Normocephalic and atraumatic. Eyes: Pupils are equal, round. Wearing glasses. ENT: Hearing grossly normal. 
Neck: Supple. No JVD present. Cardiovascular: Normal rate, regular rhythm. Exam reveals no gallop and no friction rub. No murmur heard. Pulmonary/Chest: Effort normal and breath sounds normal. No wheezes. Abdominal: Soft, no tenderness. Musculoskeletal: Moves extremities independently. Normal gait. Vasc/lymphatic: Trace LLE edema, 1+ RLE edema. Neurological: Alert,oriented. Skin: Skin is warm and dry. Psychiatric: Normal mood and affect. Behavior is normal. Judgment and thought content normal.   
  
 
Assessment/Plan:  
 
Imaging/Studies: 
Echo (2021): LVEF 40%. Mod dilated RV. Severe SUZE. Mild MR. Mild AR. Mild to mod TR. Mod PH. Trivial posterior pericardial effusion. Nuclear stress (06/22/2020): LVEF 50%. Stress echo (06/04/2020): Normal wall motion & LVEF at low workload. ICD-10-CM ICD-9-CM 1. Persistent atrial fibrillation (HCC)  I48.19 427.31 amiodarone (CORDARONE) 200 mg tablet  
   lisinopriL (PRINIVIL, ZESTRIL) 20 mg tablet 2. Cardiac pacemaker in situ  Z95.0 V45.01   
3. Essential hypertension  I10 401.9 4. NICM (nonischemic cardiomyopathy) ([facility])  I42.8 425.4 5. Systolic CHF, chronic ([facility])  I50.22 428.22   
  428.0 6. Anticoagulated  Z79.01 V58.61 Medtronic dual chamber pacemaker (DOI 07/20/2020): Device check today shows proper lead & generator function. Generator longevity estimated 13 yrs, 2 mos. RA 25.88%, RV 14.13%. Currently in AF/AFL, episode in progress since 12/26/2020, some RVR. Since 02/17/2021, no new ventricular episodes of RVR. NICM: LVEF 40%, NYHA III chronic systolic CHF. Suspect cause of his symptom is persistent AF/AFL. On Toprol XL, will add lisinopril 20 mg po daily. Persistent AF/AFL: Ongoing since 12/26/2020, some RVR. Will start amiodarone loading at 200 mg po bid x 2 weeks, then reduce to 200 mg po daily thereafter with plan to do electrical cardioversion. LFTs WNL in 03/2021, TSH WNL in 06/2020. His chances of maintaining NSR thereafter are decreased, as he has severe biatrial enlargement (not noted in 2019). If he is unable to maintain despite AAD or is unable to tolerate amiodarone, would then consider AV node ablation & upgrade to biventricular pacemaker. I have explained these options to him and his wife Given his dementia and atrial size, I do not recommend atrial ablation HTN: BP elevated today. Adding lisinopril 20 mg po daily. Anticoagulation: Continue Xarelto as previously prescribed for embolic CVA prophylaxis.   Previous hematuria, but has resolved in the past couple weeks. EP clinic follow up post DCCV. Continue remote pacer checks q 3 months. Future Appointments Date Time Provider Marielos Osorio 5/3/2021  2:00 PM Inga Rodriguez MD NEUSM BS AMB  
5/17/2021  9:00 AM REMOTE1, LOIS CH AMB  
8/18/2021 10:00 AM REMOTE1, LOIS MARIANO BS AMB  
11/9/2021  9:40 AM PACEMAKER3, LOIS CH AMB  
11/9/2021 10:00 AM MD GARCÍA Bedoya BS AMB Thank you for involving me in this patient's care and please call with further concerns or questions. Hannah Landeros M.D. Electrophysiology/Cardiology [address] [facility] [place] [address], [address] Leeanne Hernandez [address] [place] [address] 
[phone]                                        [phone] rolling walker

## 2025-02-02 NOTE — PHYSICAL THERAPY INITIAL EVALUATION ADULT - GAIT TRAINING, PT EVAL
GOAL: Patient will ambulate 150 feet independently with least restrictive assistive device in 4 weeks.

## 2025-02-03 LAB
ANION GAP SERPL CALC-SCNC: 10 MMOL/L — SIGNIFICANT CHANGE UP (ref 5–17)
BUN SERPL-MCNC: 24 MG/DL — HIGH (ref 7–23)
CALCIUM SERPL-MCNC: 8 MG/DL — LOW (ref 8.4–10.5)
CHLORIDE SERPL-SCNC: 106 MMOL/L — SIGNIFICANT CHANGE UP (ref 96–108)
CO2 SERPL-SCNC: 24 MMOL/L — SIGNIFICANT CHANGE UP (ref 22–31)
CREAT SERPL-MCNC: 1.28 MG/DL — SIGNIFICANT CHANGE UP (ref 0.5–1.3)
CULTURE RESULTS: SIGNIFICANT CHANGE UP
EGFR: 53 ML/MIN/1.73M2 — LOW
GLUCOSE SERPL-MCNC: 108 MG/DL — HIGH (ref 70–99)
HCT VFR BLD CALC: 28.8 % — LOW (ref 39–50)
HCT VFR BLD CALC: 30.6 % — LOW (ref 39–50)
HCT VFR BLD CALC: 30.7 % — LOW (ref 39–50)
HGB BLD-MCNC: 9 G/DL — LOW (ref 13–17)
HGB BLD-MCNC: 9.6 G/DL — LOW (ref 13–17)
HGB BLD-MCNC: 9.6 G/DL — LOW (ref 13–17)
MCHC RBC-ENTMCNC: 31.3 G/DL — LOW (ref 32–36)
MCHC RBC-ENTMCNC: 31.3 G/DL — LOW (ref 32–36)
MCHC RBC-ENTMCNC: 31.4 G/DL — LOW (ref 32–36)
MCHC RBC-ENTMCNC: 32 PG — SIGNIFICANT CHANGE UP (ref 27–34)
MCHC RBC-ENTMCNC: 32.2 PG — SIGNIFICANT CHANGE UP (ref 27–34)
MCHC RBC-ENTMCNC: 32.5 PG — SIGNIFICANT CHANGE UP (ref 27–34)
MCV RBC AUTO: 102.3 FL — HIGH (ref 80–100)
MCV RBC AUTO: 102.7 FL — HIGH (ref 80–100)
MCV RBC AUTO: 104 FL — HIGH (ref 80–100)
NRBC # BLD: 0 /100 WBCS — SIGNIFICANT CHANGE UP (ref 0–0)
NRBC BLD-RTO: 0 /100 WBCS — SIGNIFICANT CHANGE UP (ref 0–0)
PLATELET # BLD AUTO: 152 K/UL — SIGNIFICANT CHANGE UP (ref 150–400)
PLATELET # BLD AUTO: 175 K/UL — SIGNIFICANT CHANGE UP (ref 150–400)
PLATELET # BLD AUTO: 177 K/UL — SIGNIFICANT CHANGE UP (ref 150–400)
POTASSIUM SERPL-MCNC: 3.7 MMOL/L — SIGNIFICANT CHANGE UP (ref 3.5–5.3)
POTASSIUM SERPL-SCNC: 3.7 MMOL/L — SIGNIFICANT CHANGE UP (ref 3.5–5.3)
RBC # BLD: 2.77 M/UL — LOW (ref 4.2–5.8)
RBC # BLD: 2.98 M/UL — LOW (ref 4.2–5.8)
RBC # BLD: 3 M/UL — LOW (ref 4.2–5.8)
RBC # FLD: 15.1 % — HIGH (ref 10.3–14.5)
SODIUM SERPL-SCNC: 140 MMOL/L — SIGNIFICANT CHANGE UP (ref 135–145)
SPECIMEN SOURCE: SIGNIFICANT CHANGE UP
WBC # BLD: 3.19 K/UL — LOW (ref 3.8–10.5)
WBC # BLD: 3.69 K/UL — LOW (ref 3.8–10.5)
WBC # BLD: 3.78 K/UL — LOW (ref 3.8–10.5)
WBC # FLD AUTO: 3.19 K/UL — LOW (ref 3.8–10.5)
WBC # FLD AUTO: 3.69 K/UL — LOW (ref 3.8–10.5)
WBC # FLD AUTO: 3.78 K/UL — LOW (ref 3.8–10.5)

## 2025-02-03 PROCEDURE — 99232 SBSQ HOSP IP/OBS MODERATE 35: CPT | Mod: GC

## 2025-02-03 RX ORDER — CYANOCOBALAMIN (VITAMIN B-12) 1000MCG/ML
1000 VIAL (ML) INJECTION DAILY
Refills: 0 | Status: DISCONTINUED | OUTPATIENT
Start: 2025-02-03 | End: 2025-02-04

## 2025-02-03 RX ORDER — HEPARIN SODIUM,PORCINE 10000/ML
5000 VIAL (ML) INJECTION EVERY 12 HOURS
Refills: 0 | Status: DISCONTINUED | OUTPATIENT
Start: 2025-02-03 | End: 2025-02-04

## 2025-02-03 RX ADMIN — Medication 50 MILLIGRAM(S): at 06:53

## 2025-02-03 RX ADMIN — TAMSULOSIN HYDROCHLORIDE 0.4 MILLIGRAM(S): 0.4 CAPSULE ORAL at 21:19

## 2025-02-03 RX ADMIN — Medication 5000 UNIT(S): at 17:56

## 2025-02-03 RX ADMIN — ATORVASTATIN CALCIUM 10 MILLIGRAM(S): 80 TABLET, FILM COATED ORAL at 21:19

## 2025-02-03 RX ADMIN — FENOFIBRATE 48 MILLIGRAM(S): 145 TABLET ORAL at 12:06

## 2025-02-03 RX ADMIN — Medication 1000 MICROGRAM(S): at 13:50

## 2025-02-03 RX ADMIN — PANTOPRAZOLE 40 MILLIGRAM(S): 20 TABLET, DELAYED RELEASE ORAL at 06:54

## 2025-02-03 NOTE — DIETITIAN INITIAL EVALUATION ADULT - REASON FOR ADMISSION
Gastrointestinal hemorrhage     Gastrointestinal hemorrhage    Per chart, patient is a 89 y/o male with PMH including CAD (s/p CABGx4), chronic AVM, diverticulosis, ascending colon ulcer (3/2024) w/ hematochezia, HTN, HLD, BPH. Patient presents to [facility] w/ N/V/D w/ dark blood per rectum, 3 episodes of nonbloody emesis, continual loose stools w/ dark red colored blood. Admitted w/ c/f LGIB per MD. Identified w/ (+) norovirus during admission, per attending LGIB possibly been precipitated by norovirus infection.

## 2025-02-03 NOTE — DIETITIAN INITIAL EVALUATION ADULT - OTHER INFO
NKFA per patient. Patient does report a degree of lactose intolerance for which he doesn't have specific food items (i.e. sharp cheddar that gives significant diarrhea, but other cheeses like Swiss are tolerated w/ no issues). Patient states he will use lactaid pills when having specific lactose containing food items, inquired if we have lactaid pills here that can be provided, relayed to PA. Patient reports his UBW is consistently ~190lbs +/- a few pounds periodically. [facility]: 192lbs (3/27/2024), 192lbs (3/25/2024) then weights are from >7 years ago (205lbs on 3/25/2017, 204lbs on 10/6/2015). Will follow weight trend.    - Hypophosphatemia on 2/2 noted.  - Ordered for vitamin B12.

## 2025-02-03 NOTE — DIETITIAN INITIAL EVALUATION ADULT - ORAL INTAKE PTA/DIET HISTORY
Patient reports normally eating well PTA. Denied chewing/swallowing impairment or nausea/vomiting. Patient reports following a regular diet at home w/ no restrictions, cites his history of diverticulosis and states he does not have to avoid anything specific for digestive tolerance (states if he ever has nuts/seed products he has a small amount and chews very thoroughly with no issues). Patient reports liking to eat a balanced diet and enjoys having sushi with his friends.

## 2025-02-03 NOTE — DIETITIAN INITIAL EVALUATION ADULT - NSFNSPHYEXAMSKINFT_GEN_A_CORE
Pressure Injury 1: Bilateral:, buttocks, sacrum, IAD  Pressure Injury 2: none, none  Pressure Injury 3: none, none  Pressure Injury 4: none, none  Pressure Injury 5: none, none  Pressure Injury 6: none, none  Pressure Injury 7: none, none  Pressure Injury 8: none, none  Pressure Injury 9: none, none  Pressure Injury 10: none, none  Pressure Injury 11: none, none, Pressure Injury 1: Bilateral:, buttocks, Suspected deep tissue injury  Pressure Injury 2: none, none  Pressure Injury 3: none, none  Pressure Injury 4: none, none  Pressure Injury 5: none, none  Pressure Injury 6: none, none  Pressure Injury 7: none, none  Pressure Injury 8: none, none  Pressure Injury 9: none, none  Pressure Injury 10: none, none  Pressure Injury 11: none, none Nutrition consult received for "pressure injury stage 2 or >", WC consult appreciated; per their assessment patient w/ b/l sacrum/buttock erythematous and macerated skin on carmita-rectum, sacrum, no pressure injuries noted per chart.

## 2025-02-03 NOTE — DIETITIAN INITIAL EVALUATION ADULT - ENERGY INTAKE
Patient reports on initial day of presentation he did not eat anything (previously was on clears on first day of admission), then after advancement to low fiber diet patient had been tolerating meals well with no issues completing roughly 100% of portions served and moreover reports his BMs have been improving and are now mostly formed. Adequate (%)

## 2025-02-03 NOTE — DIETITIAN INITIAL EVALUATION ADULT - PERTINENT LABORATORY DATA
02-03    140  |  106  |  24[H]  ----------------------------<  108[H]  3.7   |  24  |  1.28    Ca    8.0[L]      03 Feb 2025 08:49  Phos  2.4     02-02  Mg     1.6     02-02    TPro  6.0  /  Alb  3.2[L]  /  TBili  0.4  /  DBili  x   /  AST  19  /  ALT  10  /  AlkPhos  33[L]  02-02  A1C with Estimated Average Glucose Result: 5.8 % (03-24-24 @ 06:58)

## 2025-02-03 NOTE — PROGRESS NOTE ADULT - ATTENDING COMMENTS
Agree with above. Patient with no further bleeding, last BM was brown. H/H is stable and increasing on its own without need for transfusions. He had a colonoscopy within the last year showing small ulcer that showed acute colitis on  biopsies, and diverticulosis. Suspect diverticular bleeding as source of self limited hematochezia. As bleeding now resolved and he has already ha recent endoscopic evaluation, no plans for repeat endoscopic evaluation at this time. Would advance diet as tolerated.

## 2025-02-03 NOTE — DIETITIAN INITIAL EVALUATION ADULT - REASON
Patient eating well PTA w/ no recent weight fluctuations reported, eating well upon diet advancement during admission, will follow parameters

## 2025-02-03 NOTE — DIETITIAN INITIAL EVALUATION ADULT - PERSON TAUGHT/METHOD
adequate caloric/protein intake w/ food sources reviewed, current diet order, low fiber nutrition education, strategies for maintaining adequate PO and fluid intake, strategies for reintroducing foods higher in fiber when medically appropriate, all questions were answered/verbal instruction/teach back - (Patient repeats in own words)/patient instructed

## 2025-02-03 NOTE — DIETITIAN INITIAL EVALUATION ADULT - PERTINENT MEDS FT
MEDICATIONS  (STANDING):  atorvastatin 10 milliGRAM(s) Oral at bedtime  cyanocobalamin 1000 MICROGram(s) Oral daily  fenofibrate Tablet 48 milliGRAM(s) Oral daily  heparin   Injectable 5000 Unit(s) SubCutaneous every 12 hours  metoprolol succinate ER 50 milliGRAM(s) Oral daily  pantoprazole    Tablet 40 milliGRAM(s) Oral before breakfast  tamsulosin 0.4 milliGRAM(s) Oral at bedtime    MEDICATIONS  (PRN):  acetaminophen     Tablet .. 650 milliGRAM(s) Oral every 6 hours PRN Temp greater or equal to 38C (100.4F), Mild Pain (1 - 3)  melatonin 3 milliGRAM(s) Oral at bedtime PRN Insomnia

## 2025-02-03 NOTE — ADVANCED PRACTICE NURSE CONSULT - RECOMMEDATIONS
Impression      Urinary incontinence    Fecal incontinence        Bilateral sacrum/buttock - Erythematous and macerated skin noted on carmita-rectum, sacrum      Recommendations       1. Bilateral sacrum/buttock - IAD     Topical therapy- sacral/bilateral buttocks- cleanse w/incontinent cleanser, pat dry & apply Triad twice daily & PRN Soiling. Monitor for changes       2. Scrotum    Topical therapy-  cleanse w/incontinent cleanser, pat dry & apply Triad twice daily & PRN Soiling. Monitor for changes       3. Incontinent management - incontinent cleanser, pads, carmita care BID      4. Maintain on an alternating air with low air loss surface       5. Turn & reposition every 2 hr.; Use positioning pillow to turn and reposition, soft pillow between bony prominences; continue measures to decrease friction/shear/pressure.     6. Nutrition optimization.      7. Place waffle cushion when out of bed to chair.

## 2025-02-03 NOTE — ADVANCED PRACTICE NURSE CONSULT - REASON FOR CONSULT
Wound consultation requested to asses skin status of scrotum, and sacrum suspected deep tissue injury, present on admission.  HPI: 89M CAD s/p CABG x 4, chronic AVM, ascending colon ulcer 3/2024 with hematochezia, HTN, HLD, BPH here with n/v/d with dark red blood per rectum since yesterday. Pt had at least 3 episodes of nonbloody emesis and continual loose stools with dark red colored blood. Pt denies any lightheadedness, dizziness, chest pain, or palpitations.     Pt has a hx of GI bleed with admission to [facility] 03/2024 for similar symptoms. At that time, CT angio  was positive for ascending colon active extravasation and pt underwent colonoscopy which showed a 20mm ulcer in ascending colon which was likely source of bleeding. Etiology of ulcer was likely ischemic. Diverticulosis was seen diffusely as well.

## 2025-02-03 NOTE — DIETITIAN INITIAL EVALUATION ADULT - NUTRITIONGOAL OUTCOME1
- patient will consume >75% of meals during hospital admission w/ good tolerance to help adequately meet nutritional needs

## 2025-02-03 NOTE — ADVANCED PRACTICE NURSE CONSULT - ASSESSMENT
Patient encountered on 9 MONTI on 2/3/25    When wound care RN arrived on unit, patient was found lying in a low air loss pressure redistribution support surface style bed. Mr. Randolph was alert, oriented, and gave consent for skin consultation. Patient was able to turn without assistance.     The wound care RN was able to visualize skin changes of sacrum / carmita-rectum consistent with IAD (incontinence-associated dermatitis) characterized by hyperpigmentation, redness, and irritated skin ranging from light pink to dark red measuring approximately 9.0 cm x 13.0 cm x 0.0 cm. Erythematous skin noted on perineum, buttocks, and carmita-rectum. Cleansed w/ incontinent cleanser, pat dry & applied Triad at bedside.     The patient presents with scrotal edema,   Once the consultation was complete, patient and RN were educated regarding the need of prompt and thorough cleansing of the skin after each incontinent episode. Recommended using a pH-balanced skin cleanser and avoiding harsh soaps or detergents. Advised against scrubbing the skin vigorously. Instructed to pat the skin dry gently. When consultation was completed, the patient was left in a right sided position, with side rails up, call bell within reach, and bed in lowest position.     Plan of care discussed with Nano (RN).         Patient encountered on 9 MONTI on 2/3/25    When wound care RN arrived on unit, patient was found lying in a low air loss pressure redistribution support surface style bed. Mr. Randolph was alert, oriented, and gave consent for skin consultation. Patient was able to turn without assistance.     The wound care RN was able to visualize skin changes of sacrum / carmita-rectum consistent with IAD (incontinence-associated dermatitis) characterized by hyperpigmentation, redness, and irritated skin ranging from light pink to dark red measuring approximately 9.0 cm x 13.0 cm x 0.0 cm. Erythematous skin noted on perineum, buttocks, and carmita-rectum. Cleansed w/ incontinent cleanser, pat dry & applied Triad at bedside.     The patient presents with Scrotal varicosities, characterized by scrotal edema, and excoriations that are bleeding. Applied Triad at bed side, keeping the wound covered and protected from incontinence.    Once the consultation was complete, patient and RN were educated regarding the need of prompt and thorough cleansing of the skin after each incontinent episode. Recommended using a pH-balanced skin cleanser and avoiding harsh soaps or detergents. Advised against scrubbing the skin vigorously. Instructed to pat the skin dry gently. When consultation was completed, the patient was left in a right sided position, with side rails up, call bell within reach, and bed in lowest position.     Plan of care discussed with Nano (AIDEN).

## 2025-02-04 ENCOUNTER — TRANSCRIPTION ENCOUNTER (OUTPATIENT)
Age: 89
End: 2025-02-04

## 2025-02-04 VITALS
RESPIRATION RATE: 18 BRPM | DIASTOLIC BLOOD PRESSURE: 65 MMHG | OXYGEN SATURATION: 94 % | HEART RATE: 72 BPM | TEMPERATURE: 98 F | SYSTOLIC BLOOD PRESSURE: 115 MMHG

## 2025-02-04 LAB
HCT VFR BLD CALC: 29.9 % — LOW (ref 39–50)
HGB BLD-MCNC: 9.6 G/DL — LOW (ref 13–17)
MCHC RBC-ENTMCNC: 32.1 G/DL — SIGNIFICANT CHANGE UP (ref 32–36)
MCHC RBC-ENTMCNC: 32.5 PG — SIGNIFICANT CHANGE UP (ref 27–34)
MCV RBC AUTO: 101.4 FL — HIGH (ref 80–100)
NRBC # BLD: 0 /100 WBCS — SIGNIFICANT CHANGE UP (ref 0–0)
NRBC BLD-RTO: 0 /100 WBCS — SIGNIFICANT CHANGE UP (ref 0–0)
PLATELET # BLD AUTO: 154 K/UL — SIGNIFICANT CHANGE UP (ref 150–400)
RBC # BLD: 2.95 M/UL — LOW (ref 4.2–5.8)
RBC # FLD: 14.6 % — HIGH (ref 10.3–14.5)
WBC # BLD: 3.26 K/UL — LOW (ref 3.8–10.5)
WBC # FLD AUTO: 3.26 K/UL — LOW (ref 3.8–10.5)

## 2025-02-04 PROCEDURE — 82746 ASSAY OF FOLIC ACID SERUM: CPT

## 2025-02-04 PROCEDURE — 85027 COMPLETE CBC AUTOMATED: CPT

## 2025-02-04 PROCEDURE — 82435 ASSAY OF BLOOD CHLORIDE: CPT

## 2025-02-04 PROCEDURE — 82803 BLOOD GASES ANY COMBINATION: CPT

## 2025-02-04 PROCEDURE — 84132 ASSAY OF SERUM POTASSIUM: CPT

## 2025-02-04 PROCEDURE — 86880 COOMBS TEST DIRECT: CPT

## 2025-02-04 PROCEDURE — 86922 COMPATIBILITY TEST ANTIGLOB: CPT

## 2025-02-04 PROCEDURE — 86901 BLOOD TYPING SEROLOGIC RH(D): CPT

## 2025-02-04 PROCEDURE — 87077 CULTURE AEROBIC IDENTIFY: CPT

## 2025-02-04 PROCEDURE — 85014 HEMATOCRIT: CPT

## 2025-02-04 PROCEDURE — 84295 ASSAY OF SERUM SODIUM: CPT

## 2025-02-04 PROCEDURE — 84484 ASSAY OF TROPONIN QUANT: CPT

## 2025-02-04 PROCEDURE — 84100 ASSAY OF PHOSPHORUS: CPT

## 2025-02-04 PROCEDURE — 85018 HEMOGLOBIN: CPT

## 2025-02-04 PROCEDURE — 85730 THROMBOPLASTIN TIME PARTIAL: CPT

## 2025-02-04 PROCEDURE — 83735 ASSAY OF MAGNESIUM: CPT

## 2025-02-04 PROCEDURE — 83605 ASSAY OF LACTIC ACID: CPT

## 2025-02-04 PROCEDURE — 87045 FECES CULTURE AEROBIC BACT: CPT

## 2025-02-04 PROCEDURE — 82272 OCCULT BLD FECES 1-3 TESTS: CPT

## 2025-02-04 PROCEDURE — 85610 PROTHROMBIN TIME: CPT

## 2025-02-04 PROCEDURE — 87507 IADNA-DNA/RNA PROBE TQ 12-25: CPT

## 2025-02-04 PROCEDURE — 99285 EMERGENCY DEPT VISIT HI MDM: CPT | Mod: 25

## 2025-02-04 PROCEDURE — 83540 ASSAY OF IRON: CPT

## 2025-02-04 PROCEDURE — 87046 STOOL CULTR AEROBIC BACT EA: CPT

## 2025-02-04 PROCEDURE — 86850 RBC ANTIBODY SCREEN: CPT

## 2025-02-04 PROCEDURE — 82947 ASSAY GLUCOSE BLOOD QUANT: CPT

## 2025-02-04 PROCEDURE — 82607 VITAMIN B-12: CPT

## 2025-02-04 PROCEDURE — 36415 COLL VENOUS BLD VENIPUNCTURE: CPT

## 2025-02-04 PROCEDURE — 86870 RBC ANTIBODY IDENTIFICATION: CPT

## 2025-02-04 PROCEDURE — 97530 THERAPEUTIC ACTIVITIES: CPT

## 2025-02-04 PROCEDURE — 82728 ASSAY OF FERRITIN: CPT

## 2025-02-04 PROCEDURE — 85025 COMPLETE CBC W/AUTO DIFF WBC: CPT

## 2025-02-04 PROCEDURE — 74177 CT ABD & PELVIS W/CONTRAST: CPT | Mod: MC

## 2025-02-04 PROCEDURE — 96374 THER/PROPH/DIAG INJ IV PUSH: CPT

## 2025-02-04 PROCEDURE — 83550 IRON BINDING TEST: CPT

## 2025-02-04 PROCEDURE — 86900 BLOOD TYPING SEROLOGIC ABO: CPT

## 2025-02-04 PROCEDURE — 97161 PT EVAL LOW COMPLEX 20 MIN: CPT

## 2025-02-04 PROCEDURE — 80048 BASIC METABOLIC PNL TOTAL CA: CPT

## 2025-02-04 PROCEDURE — 80053 COMPREHEN METABOLIC PANEL: CPT

## 2025-02-04 PROCEDURE — 82330 ASSAY OF CALCIUM: CPT

## 2025-02-04 PROCEDURE — 97116 GAIT TRAINING THERAPY: CPT

## 2025-02-04 RX ORDER — METOPROLOL SUCCINATE 25 MG
1 TABLET, EXTENDED RELEASE 24 HR ORAL
Refills: 0 | DISCHARGE

## 2025-02-04 RX ORDER — FENOFIBRATE 145 MG/1
1 TABLET ORAL
Refills: 0
Start: 2025-02-04

## 2025-02-04 RX ORDER — PANTOPRAZOLE 20 MG/1
1 TABLET, DELAYED RELEASE ORAL
Qty: 30 | Refills: 0
Start: 2025-02-04 | End: 2025-03-05

## 2025-02-04 RX ORDER — ASPIRIN 81 MG/1
1 TABLET, COATED ORAL
Refills: 0 | DISCHARGE

## 2025-02-04 RX ORDER — FENOFIBRATE 145 MG/1
1 TABLET ORAL
Qty: 0 | Refills: 0 | DISCHARGE
Start: 2025-02-04

## 2025-02-04 RX ORDER — FENOFIBRATE 145 MG/1
1 TABLET ORAL
Qty: 30 | Refills: 0
Start: 2025-02-04 | End: 2025-03-05

## 2025-02-04 RX ORDER — CYANOCOBALAMIN (VITAMIN B-12) 1000MCG/ML
1 VIAL (ML) INJECTION
Qty: 30 | Refills: 0
Start: 2025-02-04

## 2025-02-04 RX ORDER — METOPROLOL SUCCINATE 25 MG
1 TABLET, EXTENDED RELEASE 24 HR ORAL
Qty: 0 | Refills: 0 | DISCHARGE
Start: 2025-02-04

## 2025-02-04 RX ORDER — TAMSULOSIN HYDROCHLORIDE 0.4 MG/1
1 CAPSULE ORAL
Qty: 0 | Refills: 0 | DISCHARGE
Start: 2025-02-04

## 2025-02-04 RX ADMIN — FENOFIBRATE 48 MILLIGRAM(S): 145 TABLET ORAL at 11:04

## 2025-02-04 RX ADMIN — Medication 5000 UNIT(S): at 05:54

## 2025-02-04 RX ADMIN — Medication 80 MILLIGRAM(S): at 12:34

## 2025-02-04 RX ADMIN — PANTOPRAZOLE 40 MILLIGRAM(S): 20 TABLET, DELAYED RELEASE ORAL at 05:55

## 2025-02-04 RX ADMIN — Medication 1000 MICROGRAM(S): at 11:04

## 2025-02-04 RX ADMIN — Medication 50 MILLIGRAM(S): at 05:55

## 2025-02-04 NOTE — DISCHARGE NOTE NURSING/CASE MANAGEMENT/SOCIAL WORK - PATIENT PORTAL LINK FT
You can access the FollowMyHealth Patient Portal offered by [facility] by registering at the following website: [email or web address]. By joining [facility]’s FollowMyHealth portal, you will also be able to view your health information using other applications (apps) compatible with our system.

## 2025-02-04 NOTE — PROGRESS NOTE ADULT - PROVIDER SPECIALTY LIST ADULT
Cardiology
Gastroenterology
Internal Medicine

## 2025-02-04 NOTE — PROGRESS NOTE ADULT - PROBLEM SELECTOR PLAN 2
stable now  iron studies likely consistent with early iron deficiency  clearly B12 deficient  replete IM   continue to monitor
stable now  iron studies likely consistent with early iron deficiency  B12 deficiency repleted  continue to monitor
iron studies likely consistent with early iron deficiency  B12 deficiency repleted  continue B12 1000 mcg qd po

## 2025-02-04 NOTE — DISCHARGE NOTE PROVIDER - CARE PROVIDER_API CALL
Nile Sanon  Cardiovascular Disease  [address], [address]  [place] [address]  Phone: [phone]  Fax: [phone]  Established Patient  Follow Up Time: 1-3 days    Emile Arreola  Internal Medicine  [address] [place] [address], [address]  [place] [address]  Phone: [phone]  Fax: [phone]  Follow Up Time: 1 week

## 2025-02-04 NOTE — DISCHARGE NOTE PROVIDER - NSDCMRMEDTOKEN_GEN_ALL_CORE_FT
atorvastatin 10 mg oral tablet: 1 tab(s) orally once a day (at bedtime)  cyanocobalamin 1000 mcg oral tablet: 1 tab(s) orally once a day  fenofibrate 48 mg oral tablet: 1 tab(s) orally once a day  metoprolol succinate 50 mg oral tablet, extended release: 1 tab(s) orally once a day  pantoprazole 40 mg oral delayed release tablet: 1 tab(s) orally once a day (before a meal)  tamsulosin 0.4 mg oral capsule: 1 cap(s) orally once a day (at bedtime)

## 2025-02-04 NOTE — PROGRESS NOTE ADULT - SUBJECTIVE AND OBJECTIVE BOX
SUBJECTIVE: Denies chest pain or dyspnea.   	  MEDICATIONS:  metoprolol succinate ER 50 milliGRAM(s) Oral daily  acetaminophen     Tablet .. 650 milliGRAM(s) Oral every 6 hours PRN  melatonin 3 milliGRAM(s) Oral at bedtime PRN  pantoprazole    Tablet 40 milliGRAM(s) Oral before breakfast  atorvastatin 10 milliGRAM(s) Oral at bedtime  fenofibrate Tablet 48 milliGRAM(s) Oral daily  cyanocobalamin 1000 MICROGram(s) Oral daily  heparin   Injectable 5000 Unit(s) SubCutaneous every 12 hours  tamsulosin 0.4 milliGRAM(s) Oral at bedtime      REVIEW OF SYSTEMS:    CONSTITUTIONAL: No fever, weight loss, or fatigue  EYES: No eye pain, visual disturbances, or discharge  NECK: No pain or stiffness  RESPIRATORY: No cough, wheezing, chills or hemoptysis; No Shortness of Breath  CARDIOVASCULAR: No chest pain, palpitations, dizziness, or leg swelling  GASTROINTESTINAL: No abdominal or epigastric pain. No nausea, vomiting, or hematemesis; No diarrhea or constipation. No melena or hematochezia.  GENITOURINARY: No dysuria, frequency, hematuria, or incontinence  NEUROLOGICAL: No headaches, memory loss, loss of strength, numbness, or tremors  SKIN: No itching, burning, rashes, or lesions   LYMPH Nodes: No enlarged glands  MUSCULOSKELETAL: No joint pain or swelling; No muscle, back, or extremity pain  All other review of systems are negative.  	      PHYSICAL EXAM:  T(C): 36.3 (02-04-25 @ 09:28), Max: 37 (02-03-25 @ 21:36)  HR: 69 (02-04-25 @ 09:28) (55 - 80)  BP: 113/66 (02-04-25 @ 09:28) (113/66 - 163/79)  RR: 18 (02-04-25 @ 09:28) (18 - 18)  SpO2: 94% (02-04-25 @ 09:28) (94% - 97%)  Wt(kg): --  I&O's Summary    03 Feb 2025 07:01  -  04 Feb 2025 07:00  --------------------------------------------------------  IN: 240 mL / OUT: 750 mL / NET: -510 mL          PHYSICAL EXAM    Appearance: Normal	  HEENT:   Normal oral mucosa, PERRL, EOMI	  NECK: Soft and supple, No LAD, No JVD  Cardiovascular: Regular Rate and Rhythm, Normal S1 S2, No murmurs, No clicks, gallops or rubs  Respiratory: Lungs clear to auscultation	  Extremities: No clubbing, cyanosis or edema    LABS:	 	               9.6    3.26  )-----------( 154      ( 04 Feb 2025 07:34 )             29.9     02-03    140  |  106  |  24[H]  ----------------------------<  108[H]  3.7   |  24  |  1.28    Ca    8.0[L]      03 Feb 2025 08:49      
Patient is a 89y old  Male who presents with a chief complaint of hematochezia (03 Feb 2025 11:02)      DATE OF SERVICE: 02-03-25 @ 13:37    SUBJECTIVE / OVERNIGHT EVENTS: overnight events noted    ROS:  Resp: No cough no sputum production  CVS: No chest pain no palpitations no orthopnea  GI: no N/V/D  'I feel better'      MEDICATIONS  (STANDING):  atorvastatin 10 milliGRAM(s) Oral at bedtime  cyanocobalamin Injectable 1000 MICROGram(s) IntraMuscular daily  fenofibrate Tablet 48 milliGRAM(s) Oral daily  metoprolol succinate ER 50 milliGRAM(s) Oral daily  pantoprazole    Tablet 40 milliGRAM(s) Oral before breakfast  tamsulosin 0.4 milliGRAM(s) Oral at bedtime    MEDICATIONS  (PRN):  acetaminophen     Tablet .. 650 milliGRAM(s) Oral every 6 hours PRN Temp greater or equal to 38C (100.4F), Mild Pain (1 - 3)  melatonin 3 milliGRAM(s) Oral at bedtime PRN Insomnia        CAPILLARY BLOOD GLUCOSE        I&O's Summary    02 Feb 2025 07:01  -  03 Feb 2025 07:00  --------------------------------------------------------  IN: 840 mL / OUT: 710 mL / NET: 130 mL    03 Feb 2025 07:01  -  03 Feb 2025 13:37  --------------------------------------------------------  IN: 0 mL / OUT: 100 mL / NET: -100 mL        Vital Signs Last 24 Hrs  T(C): 36.6 (03 Feb 2025 09:10), Max: 37.3 (02 Feb 2025 16:59)  T(F): 97.9 (03 Feb 2025 09:10), Max: 99.2 (02 Feb 2025 16:59)  HR: 60 (03 Feb 2025 09:10) (56 - 67)  BP: 121/68 (03 Feb 2025 09:10) (113/66 - 144/78)  BP(mean): --  RR: 18 (03 Feb 2025 09:10) (18 - 18)  SpO2: 96% (03 Feb 2025 09:10) (95% - 97%)    PHYSICAL EXAM:  CHEST/LUNG: clear  HEART: S1 S2; no murmurs  ABDOMEN: Soft, Nontender  EXTREMITIES:   no edema  NEUROLOGY: AO x 3 non-focal  SKIN: chronic scrotal swelling with some erythema    LABS:                        9.6    3.19  )-----------( 175      ( 03 Feb 2025 08:49 )             30.7     02-03    140  |  106  |  24[H]  ----------------------------<  108[H]  3.7   |  24  |  1.28    Ca    8.0[L]      03 Feb 2025 08:49  Phos  2.4     02-02  Mg     1.6     02-02    TPro  6.0  /  Alb  3.2[L]  /  TBili  0.4  /  DBili  x   /  AST  19  /  ALT  10  /  AlkPhos  33[L]  02-02          Urinalysis Basic - ( 03 Feb 2025 08:49 )    Color: x / Appearance: x / SG: x / pH: x  Gluc: 108 mg/dL / Ketone: x  / Bili: x / Urobili: x   Blood: x / Protein: x / Nitrite: x   Leuk Esterase: x / RBC: x / WBC x   Sq Epi: x / Non Sq Epi: x / Bacteria: x          All consultant(s) notes reviewed and care discussed with other providers        Contact Number, Dr Joseph [phone]
SUBJECTIVE: The patient denies chest pain, shortness of breath, arm pain or jaw pain, dizziness or palpitations.                     Denies blood in stool  	  MEDICATIONS:  metoprolol succinate ER 50 milliGRAM(s) Oral daily        acetaminophen     Tablet .. 650 milliGRAM(s) Oral every 6 hours PRN  melatonin 3 milliGRAM(s) Oral at bedtime PRN    pantoprazole    Tablet 40 milliGRAM(s) Oral before breakfast    atorvastatin 10 milliGRAM(s) Oral at bedtime  fenofibrate Tablet 48 milliGRAM(s) Oral daily    cyanocobalamin Injectable 1000 MICROGram(s) IntraMuscular daily  tamsulosin 0.4 milliGRAM(s) Oral at bedtime      REVIEW OF SYSTEMS:    CONSTITUTIONAL: No fever, weight loss, or fatigue  EYES: No eye pain, visual disturbances, or discharge  NECK: No pain or stiffness  RESPIRATORY: No cough, wheezing, chills or hemoptysis; No Shortness of Breath  CARDIOVASCULAR: No chest pain, palpitations, dizziness, or leg swelling  GASTROINTESTINAL: No abdominal or epigastric pain. No nausea, vomiting, or hematemesis; No diarrhea or constipation. No melena or hematochezia.  GENITOURINARY: No dysuria, frequency, hematuria, or incontinence  NEUROLOGICAL: No headaches, memory loss, loss of strength, numbness, or tremors  SKIN: No itching, burning, rashes, or lesions   LYMPH Nodes: No enlarged glands  MUSCULOSKELETAL: No joint pain or swelling; No muscle, back, or extremity pain  All other review of systems are negative.  	  [ ] Unable to obtain    PHYSICAL EXAM:  T(C): 36.6 (02-03-25 @ 09:10), Max: 37.3 (02-02-25 @ 16:59)  HR: 60 (02-03-25 @ 09:10) (56 - 67)  BP: 121/68 (02-03-25 @ 09:10) (113/66 - 144/78)  RR: 18 (02-03-25 @ 09:10) (18 - 18)  SpO2: 96% (02-03-25 @ 09:10) (95% - 97%)  Wt(kg): --  I&O's Summary    02 Feb 2025 07:01  -  03 Feb 2025 07:00  --------------------------------------------------------  IN: 840 mL / OUT: 710 mL / NET: 130 mL          PHYSICAL EXAM    Appearance: Normal	  HEENT:   Normal oral mucosa, PERRL, EOMI	  NECK: Soft and supple, No LAD, No JVD  Cardiovascular: Regular Rate and Rhythm, Normal S1 S2, No murmurs, No clicks, gallops or rubs  Respiratory: Lungs clear to auscultation	  Gastrointestinal:  Soft, Non-tender, + BS	  Skin: No rashes, No ecchymoses, No cyanosis  Neurologic: Non-focal  Extremities: No clubbing, cyanosis or edema  Vascular: Peripheral pulses palpable 2+ bilaterally  Scrotum: edema and varicosities    	    LABS:	 	                            9.6    3.19  )-----------( 175      ( 03 Feb 2025 08:49 )             30.7     02-03    140  |  106  |  24[H]  ----------------------------<  108[H]  3.7   |  24  |  1.28    Ca    8.0[L]      03 Feb 2025 08:49  Phos  2.4     02-02  Mg     1.6     02-02    TPro  6.0  /  Alb  3.2[L]  /  TBili  0.4  /  DBili  x   /  AST  19  /  ALT  10  /  AlkPhos  33[L]  02-02          
No further hematochezia but is having diarrhea  /67 HR 75  Lungs/ clear  RR 1/6 systolic murmur  No edema    WBC 3.0  Hgb 9.2  Hct 29.0  Plt 151K  BUN 22  Crt 1.32    Stool positive for Norovirus    Imp:  Lower GI bleeding probably secondary to diverticulosis and Noravirus  Hgb seems to have stabilized    Stable from a CV stand point  Monitor CBC   Maintain off ASA for now
Interval Events:   -NAEON  -Hb trend stable  -VSS  -found norovirus+    Hospital Medications:  acetaminophen     Tablet .. 650 milliGRAM(s) Oral every 6 hours PRN  atorvastatin 10 milliGRAM(s) Oral at bedtime  cyanocobalamin Injectable 1000 MICROGram(s) IntraMuscular daily  fenofibrate Tablet 48 milliGRAM(s) Oral daily  melatonin 3 milliGRAM(s) Oral at bedtime PRN  metoprolol succinate ER 50 milliGRAM(s) Oral daily  pantoprazole    Tablet 40 milliGRAM(s) Oral before breakfast  tamsulosin 0.4 milliGRAM(s) Oral at bedtime      PHYSICAL EXAM:   Vital Signs:  Vital Signs Last 24 Hrs  T(C): 36.6 (03 Feb 2025 06:00), Max: 37.3 (02 Feb 2025 16:59)  T(F): 97.9 (03 Feb 2025 06:00), Max: 99.2 (02 Feb 2025 16:59)  HR: 62 (03 Feb 2025 06:00) (56 - 67)  BP: 133/71 (03 Feb 2025 06:00) (113/66 - 144/78)  BP(mean): --  RR: 18 (03 Feb 2025 06:00) (18 - 18)  SpO2: 97% (03 Feb 2025 06:00) (95% - 97%)    Parameters below as of 03 Feb 2025 06:00  Patient On (Oxygen Delivery Method): room air      Daily     Daily     GENERAL:  No acute distress  HEENT:  Normocephalic/atraumatic  CHEST:  No accessory muscle use  HEART:  Regular rate and rhythm  ABDOMEN:  Soft, non-tender, non-distended  EXTREMITIES: No b/l LE edema  SKIN:  No rash  NEURO:  Alert and oriented x 3                        9.0    3.69  )-----------( 152      ( 03 Feb 2025 01:26 )             28.8     02-02    140  |  107  |  22  ----------------------------<  92  3.5   |  25  |  1.32[H]    Ca    8.2[L]      02 Feb 2025 06:33  Phos  2.4     02-02  Mg     1.6     02-02    TPro  6.0  /  Alb  3.2[L]  /  TBili  0.4  /  DBili  x   /  AST  19  /  ALT  10  /  AlkPhos  33[L]  02-02    LIVER FUNCTIONS - ( 02 Feb 2025 06:33 )  Alb: 3.2 g/dL / Pro: 6.0 g/dL / ALK PHOS: 33 U/L / ALT: 10 U/L / AST: 19 U/L / GGT: x             
Patient is a 89y old  Male who presents with a chief complaint of hematochezia (04 Feb 2025 12:05)      DATE OF SERVICE: 02-04-25 @ 13:20    SUBJECTIVE / OVERNIGHT EVENTS: overnight events noted    ROS:  Resp: No cough no sputum production  CVS: No chest pain no palpitations no orthopnea  GI: no N/V/D  'I feel fine'  no diarrhea         MEDICATIONS  (STANDING):  atorvastatin 10 milliGRAM(s) Oral at bedtime  cyanocobalamin 1000 MICROGram(s) Oral daily  fenofibrate Tablet 48 milliGRAM(s) Oral daily  heparin   Injectable 5000 Unit(s) SubCutaneous every 12 hours  metoprolol succinate ER 50 milliGRAM(s) Oral daily  pantoprazole    Tablet 40 milliGRAM(s) Oral before breakfast  tamsulosin 0.4 milliGRAM(s) Oral at bedtime    MEDICATIONS  (PRN):  acetaminophen     Tablet .. 650 milliGRAM(s) Oral every 6 hours PRN Temp greater or equal to 38C (100.4F), Mild Pain (1 - 3)  melatonin 3 milliGRAM(s) Oral at bedtime PRN Insomnia        CAPILLARY BLOOD GLUCOSE        I&O's Summary    03 Feb 2025 07:01  -  04 Feb 2025 07:00  --------------------------------------------------------  IN: 240 mL / OUT: 750 mL / NET: -510 mL    04 Feb 2025 07:01  -  04 Feb 2025 13:20  --------------------------------------------------------  IN: 240 mL / OUT: 0 mL / NET: 240 mL        Vital Signs Last 24 Hrs  T(C): 36.3 (04 Feb 2025 09:28), Max: 37 (03 Feb 2025 21:36)  T(F): 97.4 (04 Feb 2025 09:28), Max: 98.6 (03 Feb 2025 21:36)  HR: 69 (04 Feb 2025 09:28) (55 - 80)  BP: 113/66 (04 Feb 2025 09:28) (113/66 - 163/79)  BP(mean): --  RR: 18 (04 Feb 2025 09:28) (18 - 18)  SpO2: 94% (04 Feb 2025 09:28) (94% - 97%)    PHYSICAL EXAM:  CHEST/LUNG: clear  HEART: S1 S2; no murmurs  ABDOMEN: Soft, Nontender  EXTREMITIES:   no edema  NEUROLOGY: AO x 3 non-focal      LABS:                        9.6    3.26  )-----------( 154      ( 04 Feb 2025 07:34 )             29.9     02-03    140  |  106  |  24[H]  ----------------------------<  108[H]  3.7   |  24  |  1.28    Ca    8.0[L]      03 Feb 2025 08:49            Urinalysis Basic - ( 03 Feb 2025 08:49 )    Color: x / Appearance: x / SG: x / pH: x  Gluc: 108 mg/dL / Ketone: x  / Bili: x / Urobili: x   Blood: x / Protein: x / Nitrite: x   Leuk Esterase: x / RBC: x / WBC x   Sq Epi: x / Non Sq Epi: x / Bacteria: x          All consultant(s) notes reviewed and care discussed with other providers        Contact Number, Dr Joseph [phone]
Patient is a 89y old  Male who presents with a chief complaint of hematochezia (02 Feb 2025 08:42)  patient known to me from prior admission, assigned this AM to assume care  chart reviewed and events thus far noted  admitted overnight by hospitalist colleague     DATE OF SERVICE: 02-02-25 @ 13:59    SUBJECTIVE / OVERNIGHT EVENTS: overnight events noted    ROS:  Resp: No cough no sputum production  CVS: No chest pain no palpitations no orthopnea  GI: no N/V + 2 episodes loose stool overnight no BRBPR     MEDICATIONS  (STANDING):  atorvastatin 10 milliGRAM(s) Oral at bedtime  cyanocobalamin Injectable 1000 MICROGram(s) IntraMuscular daily  fenofibrate Tablet 48 milliGRAM(s) Oral daily  metoprolol succinate ER 50 milliGRAM(s) Oral daily  pantoprazole    Tablet 40 milliGRAM(s) Oral before breakfast  tamsulosin 0.4 milliGRAM(s) Oral at bedtime    MEDICATIONS  (PRN):  acetaminophen     Tablet .. 650 milliGRAM(s) Oral every 6 hours PRN Temp greater or equal to 38C (100.4F), Mild Pain (1 - 3)  melatonin 3 milliGRAM(s) Oral at bedtime PRN Insomnia        CAPILLARY BLOOD GLUCOSE        I&O's Summary    01 Feb 2025 07:01  -  02 Feb 2025 07:00  --------------------------------------------------------  IN: 0 mL / OUT: 600 mL / NET: -600 mL    02 Feb 2025 07:01  -  02 Feb 2025 13:59  --------------------------------------------------------  IN: 360 mL / OUT: 110 mL / NET: 250 mL        Vital Signs Last 24 Hrs  T(C): 36.9 (02 Feb 2025 13:43), Max: 37.2 (01 Feb 2025 16:04)  T(F): 98.4 (02 Feb 2025 13:43), Max: 99 (01 Feb 2025 16:04)  HR: 64 (02 Feb 2025 13:43) (56 - 65)  BP: 113/66 (02 Feb 2025 13:43) (113/66 - 143/71)  BP(mean): --  RR: 18 (02 Feb 2025 13:43) (18 - 20)  SpO2: 97% (02 Feb 2025 13:43) (92% - 98%)    PHYSICAL EXAM:  GENERAL: in no apparent distress  HEAD:  Atraumatic, Normocephalic  EYES: EOMI, PERRLA, sclera clear  NECK: Supple, No JVD  CHEST/LUNG: clear b/l, no wheeze  HEART: S1 S2; no murmurs appreciated  ABDOMEN: Soft, Nontender, Bowel sounds present  EXTREMITIES:  No clubbing or cyanosis, chronic LLE edema   NEUROLOGY: AO x 3 non-focal  SKIN: No rashes or lesions    LABS:                        9.2    3.00  )-----------( 151      ( 02 Feb 2025 01:34 )             29.0     02-02    140  |  107  |  22  ----------------------------<  92  3.5   |  25  |  1.32[H]    Ca    8.2[L]      02 Feb 2025 06:33  Phos  2.4     02-02  Mg     1.6     02-02    TPro  6.0  /  Alb  3.2[L]  /  TBili  0.4  /  DBili  x   /  AST  19  /  ALT  10  /  AlkPhos  33[L]  02-02    PT/INR - ( 01 Feb 2025 05:36 )   PT: 13.3 sec;   INR: 1.16 ratio         PTT - ( 01 Feb 2025 05:36 )  PTT:28.8 sec      Urinalysis Basic - ( 02 Feb 2025 06:33 )    Color: x / Appearance: x / SG: x / pH: x  Gluc: 92 mg/dL / Ketone: x  / Bili: x / Urobili: x   Blood: x / Protein: x / Nitrite: x   Leuk Esterase: x / RBC: x / WBC x   Sq Epi: x / Non Sq Epi: x / Bacteria: x          All consultant(s) notes reviewed and care discussed with other providers        Contact Number, Dr Joseph [phone]

## 2025-02-04 NOTE — DISCHARGE NOTE NURSING/CASE MANAGEMENT/SOCIAL WORK - FINANCIAL ASSISTANCE
[facility] provides services at a reduced cost to those who are determined to be eligible through [facility]’s financial assistance program. Information regarding [facility]’s financial assistance program can be found by going to [email or web address] or by calling [phone].

## 2025-02-04 NOTE — PROGRESS NOTE ADULT - NSPROGADDITIONALINFOA_GEN_ALL_CORE
discussed with patient, expresses understanding of treatment plans.  discussed with wife and daughter at bedside   encounter 50 min including PE, progress note, medication adjustment and d/w ACP and patient
discussed with patient, expresses understanding of treatment plans.  discharge plan today
discharge planning tomorrow   discussed with patient, expresses understanding of treatment plans.

## 2025-02-04 NOTE — PROGRESS NOTE ADULT - PROBLEM SELECTOR PLAN 5
asymptomatic  continue tamsulosin

## 2025-02-04 NOTE — PROGRESS NOTE ADULT - ASSESSMENT
89M CAD s/p CABG (on ASA), chronic AVM, ascending colon ulcer 3/2024 with hematochezia, diverticulosis, HTN, HLD, BPH here with n/v and hematochezia that started around 2am 2/1/2025.    #Hematochezia  #Macrocytic Anemia  #Hx LGIB - Colonic Ulcer  #Diverticulosis  #CAD s/p CABG (on ASA)  +Norovirus    DDx: lower GI bleed includes diverticular hemorrhage (most likely culprit), colon malignancy, hemorrhoids, rectal varices, inflammatory/ischemic/infectious colitis.    Recommendations:   - supportive measures for norovirus  - advance diet as tolerated  - trend daily CBC  - Transfuse if hgb < 7 or hgb < 8 in patients with underlying cardiac comorbidities.   - Please make sure patient has adequate peripheral IV access  - no plan for luminal evaluation at this time - pt and family prefer to pursue conservative measures, but are willing to revisit discussion for colonoscopy if clinical need arises  - Mary Jane as per primary team    
#ashd status post quadruple bypass  #HTN  # HL  # rectal bleeding : possible norovirus, on contact isolation.  Close follow up h/h; conservative treatment planned at this time as per gi.  Resume asa if/when GI approves.  Stable at present cv perspective.     
 89y old  Male who presents with a chief complaint of hematochezia   Lower GI bleeding probably secondary to diverticulosis and Noravirus (2 family members at home now have diarrhea as per patient)  Hgb seems to have stabilized      Monitor CBC   Maintain off ASA for now  The patient is stable from a cardiovascular perspective.  
89M CAD s/p CABG x 4, chronic AVM, ascending colon ulcer 3/2024 with hematochezia, HTN, HLD, BPH here with n/v/d with dark red blood per rectum since yesterday evening concerning for LGIB.  

## 2025-02-04 NOTE — PROGRESS NOTE ADULT - PROBLEM SELECTOR PLAN 3
continue to monitor   home meds with hold parameters

## 2025-02-04 NOTE — DISCHARGE NOTE PROVIDER - NSDCCPCAREPLAN_GEN_ALL_CORE_FT
PRINCIPAL DISCHARGE DIAGNOSIS  Diagnosis: GI bleed  Assessment and Plan of Treatment: HOLD ASPIRIN FOR 1 week  Gastrointestinal (GI) bleeding may occur in any part of your digestive tract. This includes your esophagus, stomach, intestines, rectum, or anus. The bleeding may or may not be seen easily. Bleeding may be sudden and last only a short time, or it may go on for a long time and occur more than once  Call your doctor and seek medical attention if you have any of the following:  You have bowel movements that are tarry or black.  You have abdominal pain or swelling, nausea, or vomiting.  You have heartburn or other signs of stomach acid problems.  You have questions or concerns about your condition or care.  Chest pain  Dizziness or fainting, especially after suddenly moving from a sitting or lying position  Confusion or shortness of breath  Weakness  Please follow-up with your PCP within 1 week of discharge for further monitoring.        SECONDARY DISCHARGE DIAGNOSES  Diagnosis: History of BPH  Assessment and Plan of Treatment: You have an enlarged prostate gland which gets bigger as men get older - it is a very common problem and has nothing to do with prostate cancer  Call your doctor if you are urinating more frequently, have trouble starting to urinate, have weak stream, urine leaking or dribbling, and feeling as though bladder is not empty after urination  Your doctor will monitor your prostate with a rectal exam as well as urine or blood testing  You can help yourself by reducing the amount of fluid you drink before going to bed, limiting the amount of alcohol & caffeine you drink   Avoid cold & allergy medication that contain decongestants or antihistamines which make BPH symptoms worse  You can also "double void" by waiting a moment after urinating & trying again  Take your medication as prescribed - one medication helps to relax the muscle around the urethra and the other medication you may take prevents the prostate from growing more or even shrinking the prostate      Diagnosis: HTN (hypertension)  Assessment and Plan of Treatment: Follow up with your medical doctor to establish long term blood pressure treatment goals.      Diagnosis: S/P CABG x 4  Assessment and Plan of Treatment: Coronary artery disease is a condition where the arteries the supply the heart muscle get clogges with fatty deposits & puts you at risk for a heart attack  Call your doctor if you have any new pain, pressure, or discomfort in the center of your chest, pain, tingling or discomfort in arms, back, neck, jaw, or stomach, shortness of breath, nausea, vomiting, burping or heartburn, sweating, cold and clammy skin, racing or abnormal heartbeat for more than 10 minutes or if they keep coming & going.  Call 911 and do not tr to get to hospital by care  You can help yourself with lefestyle changes (quitting smoking if you smoke), eat lots of fruits & vegetables & low fat dairy products, not a lot of meat & fatty foods, walk or some form of physical activity most days of the week, lose weight if you are overweight  Take your cardiac medication as prescribed to lower cholesterol, to lower blood pressure, aspirin to prevent blood clots, and diabetes control  Make sure to keep appointments with doctor for cardiac follow up care      Diagnosis: Anemia due to acute blood loss  Assessment and Plan of Treatment: improved , monitor cbc outpatient with PMD

## 2025-02-04 NOTE — PROGRESS NOTE ADULT - PROBLEM SELECTOR PLAN 6
chest pain free  euvolemic  continue to monitor

## 2025-02-04 NOTE — DISCHARGE NOTE PROVIDER - PROVIDER TOKENS
PROVIDER:[TOKEN:[[id number]],FOLLOWUP:[1-3 days],ESTABLISHEDPATIENT:[T]],PROVIDER:[TOKEN:[[id number]],FOLLOWUP:[1 week]]

## 2025-02-04 NOTE — DISCHARGE NOTE PROVIDER - CARE PROVIDERS DIRECT ADDRESSES
[email or web address]

## 2025-02-04 NOTE — DISCHARGE NOTE PROVIDER - HOSPITAL COURSE
HPI:  89M CAD s/p CABG x 4, chronic AVM, ascending colon ulcer 3/2024 with hematochezia, HTN, HLD, BPH here with n/v/d with dark red blood per rectum since yesterday. Pt had at least 3 episodes of nonbloody emesis and continual loose stools with dark red colored blood. Pt denies any lightheadedness, dizziness, chest pain, or palpitations.     Pt has a hx of GI bleed with admission to [facility] 03/2024 for similar symptoms. At that time, CT angio  was positive for ascending colon active extravasation and pt underwent colonoscopy which showed a 20mm ulcer in ascending colon which was likely source of bleeding. Etiology of ulcer was likely ischemic. Diverticulosis was seen diffusely as well.     In the ED, initial /72, HR 75, RR 20, afebrile, SaO2 100% on RA.  (01 Feb 2025 14:01)    Hospital Course:   89 yrs old male Recent episode of nausea, vomiting, diarrhea, and dark red blood per rectum indicating a lower gastrointestinal bleed, potentially precipitated by a Norovirus infection  The patient presented with gastrointestinal symptoms including nausea, vomiting, diarrhea, and concerning dark red blood per rectum since the prior evening. Given his history of a colon ulcer and current symptoms, the primary concern was a lower gastrointestinal bleed. His hemoglobin level remained stable throughout his stay, alleviating immediate concerns for severe acute blood loss. Iron studies indicated early signs of iron deficiency, and there was a noted need for B12, which was repleted.    Despite these gastrointestinal symptoms, the patient was stable with his chronic comorbidities, including hypertension, for which monitoring continued without changes to his usual medication regime. His hyperlipidemia was managed with a continuation of his statin therapy. His prostate health remained stable, and tamsulosin therapy continued. Regarding his cardiac status post-CABG, the patient remained chest pain-free and euvolemic throughout his stay. Aspirin on hold for 1 week then resume with close monitoring and notify PMD if bleeding occurs    Differential diagnosis for his lower gastrointestinal bleed included diverticular hemorrhage, colon malignancy, hemorrhoids, rectal varices, and various colitides. The Norovirus was suspected to be a contributing factor to his gastrointestinal symptoms. There were supportive measures for the Norovirus and a conservative approach was planned initially for the GI bleed, with daily trending of complete blood count and interventions prepared for transfusions if necessary.    Given the patient's risk for deep vein thrombosis, heparin was initiated for prophylaxis. The decision was made, in alignment with the patient and his family, to pursue conservative management initially for the GI symptoms and potential sources of bleeding, foregoing immediate invasive luminal evaluations like colonoscopy unless the clinical need intensified.    In terms of skin integrity, the patient developed erythematous, macerated skin on the carmita-rectum and sacrum areas, managed with topical therapy and diligent skincare, including regular cleansing and application of a protective barrier cream. Repositioning schedules were maintained to prevent pressure ulcers, alongside optimized nutritional support and the use of a waffle cushion to reduce pressure when out of bed.    Recommendations at Discharge:  Continue monitoring hemoglobin and manage anemia conservatively. Consider gastrointestinal evaluation if symptoms worsen.  Continue current medications for chronic conditions without changes.  Manage potential Norovirus infection with supportive care and hydration.  Continue diligent skin care and incontinence management to prevent deterioration of skin integrity.  Resume daily activities as tolerated, with physical support to prevent falls and other complications.  Regular outpatient follow-up with primary care physician and gastroenterologist to monitor condition and decide on any further diagnostic needs.  In case of worsening symptoms, particularly related to GI bleed or cardiac issues, seek immediate medical attention.  Follow-up: The patient was advised to follow up with his primary care physician within a week of discharge and with a gastroenterologist as recommended. Regular checks on skin integrity, hemoglobin levels, and overall well-being are essential. The conservative management plan for his GI concerns can be revisited based on his symptomatology and in consultation with his healthcare team.      Important Medication Changes and Reason: see medication reconciliation list      Advanced Directives:   [x ] Full code  [ ] DNR  [ ] Hospice    Discharge Diagnoses:  Coronary artery disease, status post quadruple coronary artery bypass grafting  Chronic arteriovenous malformation  History of ascending colon ulcer (March 2024) with associated hematochezia  Hypertension  Hyperlipidemia  Benign prostatic hyperplasia

## 2025-02-04 NOTE — PROGRESS NOTE ADULT - PROBLEM SELECTOR PLAN 1
hemoglobin stable  no further intervention at this time   GI recommendations appreciated
hemoglobin stable  start heparin for DVT prophylaxis  patient at risk fo rDVT  continue to monitor hemoglobin  could have possibly been precipitated by Norovirus infection
appears to have resolved  advance diet to LR  patient and family request conservative management  hold all anticoagulation   continue to monitor hemoglobin  could have possibly been precipitated by Norovirus infection  wife remains asymptomatic   supportive care

## (undated) DEVICE — FORCEP RADIAL JAW 4 JUMBO 2.8MM 3.2MM 240CM ORANGE DISP

## (undated) DEVICE — IRRIGATOR BIO SHIELD

## (undated) DEVICE — SYR LUER LOK 50CC

## (undated) DEVICE — ELCTR GROUNDING PAD ADULT COVIDIEN

## (undated) DEVICE — CATH IV SAFE BC 20G X 1.16" (PINK)

## (undated) DEVICE — FOLEY HOLDER STATLOCK 2 WAY ADULT

## (undated) DEVICE — TUBING IV SET GRAVITY 3Y 100" MACRO

## (undated) DEVICE — PACK IV START WITH CHG

## (undated) DEVICE — BRUSH COLONOSCOPY CYTOLOGY

## (undated) DEVICE — SOL INJ NS 0.9% 500ML 2 PORT

## (undated) DEVICE — BIOPSY FORCEP RADIAL JAW 4 STANDARD WITH NEEDLE

## (undated) DEVICE — CLAMP BX HOT RAD JAW 3

## (undated) DEVICE — POLY TRAP ETRAP

## (undated) DEVICE — TUBING SUCTION 20FT

## (undated) DEVICE — SENSOR O2 FINGER ADULT

## (undated) DEVICE — TUBING SUCTION CONN 6FT STERILE

## (undated) DEVICE — SUCTION YANKAUER NO CONTROL VENT

## (undated) DEVICE — CATH IV SAFE BC 22G X 1" (BLUE)